# Patient Record
Sex: MALE | Race: WHITE | Employment: UNEMPLOYED | URBAN - METROPOLITAN AREA
[De-identification: names, ages, dates, MRNs, and addresses within clinical notes are randomized per-mention and may not be internally consistent; named-entity substitution may affect disease eponyms.]

---

## 2017-04-12 ENCOUNTER — OFFICE VISIT (OUTPATIENT)
Dept: FAMILY MEDICINE CLINIC | Age: 62
End: 2017-04-12

## 2017-04-12 VITALS
OXYGEN SATURATION: 97 % | WEIGHT: 177.4 LBS | DIASTOLIC BLOOD PRESSURE: 91 MMHG | SYSTOLIC BLOOD PRESSURE: 169 MMHG | RESPIRATION RATE: 18 BRPM | TEMPERATURE: 97.7 F | BODY MASS INDEX: 26.89 KG/M2 | HEART RATE: 78 BPM | HEIGHT: 68 IN

## 2017-04-12 DIAGNOSIS — Z12.5 SCREENING PSA (PROSTATE SPECIFIC ANTIGEN): ICD-10-CM

## 2017-04-12 DIAGNOSIS — I69.328 CVA, OLD, SPEECH/LANGUAGE DEFICIT: Primary | ICD-10-CM

## 2017-04-12 DIAGNOSIS — Z72.0 TOBACCO ABUSE DISORDER: ICD-10-CM

## 2017-04-12 DIAGNOSIS — E78.00 HYPERCHOLESTEROLEMIA: ICD-10-CM

## 2017-04-12 DIAGNOSIS — I10 HTN, GOAL BELOW 130/80: ICD-10-CM

## 2017-04-12 DIAGNOSIS — R79.9 ABNORMAL FINDING OF BLOOD CHEMISTRY: ICD-10-CM

## 2017-04-12 LAB — HBA1C MFR BLD HPLC: 5.1 %

## 2017-04-12 RX ORDER — LOSARTAN POTASSIUM AND HYDROCHLOROTHIAZIDE 12.5; 5 MG/1; MG/1
1 TABLET ORAL DAILY
Qty: 30 TAB | Refills: 5 | Status: SHIPPED | OUTPATIENT
Start: 2017-04-12 | End: 2017-04-12 | Stop reason: SDUPTHER

## 2017-04-12 RX ORDER — ROSUVASTATIN CALCIUM 10 MG/1
TABLET, COATED ORAL
Qty: 90 TAB | Refills: 11 | Status: SHIPPED | OUTPATIENT
Start: 2017-04-12 | End: 2017-12-28 | Stop reason: SDUPTHER

## 2017-04-12 RX ORDER — LOSARTAN POTASSIUM AND HYDROCHLOROTHIAZIDE 12.5; 5 MG/1; MG/1
TABLET ORAL
Qty: 90 TAB | Refills: 5 | Status: SHIPPED | OUTPATIENT
Start: 2017-04-12 | End: 2017-05-01 | Stop reason: ALTCHOICE

## 2017-04-12 RX ORDER — ASPIRIN 81 MG/1
81 TABLET ORAL DAILY
COMMUNITY
End: 2018-11-28 | Stop reason: SDUPTHER

## 2017-04-12 RX ORDER — ROSUVASTATIN CALCIUM 10 MG/1
10 TABLET, COATED ORAL
Qty: 30 TAB | Refills: 11 | Status: SHIPPED | OUTPATIENT
Start: 2017-04-12 | End: 2017-04-12 | Stop reason: SDUPTHER

## 2017-04-12 NOTE — PROGRESS NOTES
HISTORY OF PRESENT ILLNESS  Milena Rodriguez is a 63 y.o. male. HPI   Present as new pt,s tating that his [address] would not take his [employer] for almost one yr did not see any provider as he states currently taking 81mg of aspirin as his only meds, no colonscopy has been done , nor any colon screening done on him so far    HTN w/ hs of CVA, and rt side weaknesses  Today pt present for Bp check and the patient stating that so far there has not been a Compliancy w/ the bp medsfor >1yr, w/ hx of stroke in 2010, at that time he was taking bp meds,and opioid based pain meds and the daily aspirin, ++cigs 1ppd for 30yrs, was taking 2ppd but recnetly to 1ppd, now with  rt sided hemiparesis, uses the cane for mobility, lives by self a home, no other provider, able to do his ADL at this time,   He is having had the low salt diet,  the patient has been active life style and does do the daily walking,  pt states that patien does not obtain the bp at home   today the pt denies Chest Pain, has no legs swelling no lightheadedness,   the pat has not been feeling anxious, and  Has not been feeling stressed out, having nl interest to do things,  otherwise feeling better since the last visit    Current Outpatient Prescriptions   Medication Sig Dispense Refill    aspirin delayed-release 81 mg tablet Take 81 mg by mouth daily. No Known Allergies  Past Medical History:   Diagnosis Date    Acute MI, inferoposterior wall, initial episode of care ([facility] [address] 4/22/2011    Arthritis     CAD (coronary artery disease) 4/23/2011    CAD (coronary artery disease), native coronary artery 4/22/2011    Chronic pain     Hypertension 4/22/2011    Stroke ([facility] [address]     Unstable angina ([address] 4/22/2011     History reviewed. No pertinent surgical history.   Family History   Problem Relation Age of Onset   Orien Haus Dementia Mother     Emphysema Father     Hypertension Father      Social History   Substance Use Topics    Smoking status: Current Every Day Smoker     Packs/day: 1.00     Years: 35.00    Smokeless tobacco: Never Used    Alcohol use No      No results found for: HBA1C, PUQ3KHNE, HGBE8, GLU, GESTF, GLUCPOC, MCACR, MCA1, MCA2, MCA3, MCA4, UMCA, MCAU, LDL, DLDL, LDLC, DLDLP, FORTUNATO, CREAPOC, MCREA, REFC7, ACREA, CREA, REFC3, REFC4, KLQ2EDKF   No results found for: CHOL, CHOLX, CHLST, CHOLV, HDL, LDL, DLDL, LDLC, DLDLP, TGL, TGLX, TRIGL, [id number], TRIGP, CHHD, CHHDX       Review of Systems   Constitutional: Negative for chills and fever. HENT: Negative for ear pain and nosebleeds. Eyes: Negative for blurred vision, pain and discharge. Respiratory: Negative for shortness of breath. Cardiovascular: Negative for chest pain and leg swelling. Gastrointestinal: Negative for constipation, diarrhea, nausea and vomiting. Genitourinary: Negative for frequency. Musculoskeletal: Negative for joint pain. Skin: Negative for itching and rash. Neurological: Positive for tingling and focal weakness. Negative for headaches. Psychiatric/Behavioral: Negative for depression. The patient is not nervous/anxious. Physical Exam   Constitutional: He is oriented to person, place, and time. He appears well-developed and well-nourished. HENT:   Head: Normocephalic and atraumatic. Mouth/Throat: No oropharyngeal exudate. Eyes: Conjunctivae and EOM are normal.   Neck: Normal range of motion. Neck supple. Cardiovascular: Normal rate, regular rhythm and normal heart sounds. No murmur heard. Pulmonary/Chest: Effort normal and breath sounds normal. No respiratory distress. Abdominal: Soft. Bowel sounds are normal. He exhibits no distension. There is no rebound. Musculoskeletal: He exhibits deformity. He exhibits no edema or tenderness. Neurological: He is alert and oriented to person, place, and time. He displays abnormal reflex. A cranial nerve deficit is present. He exhibits abnormal muscle tone. Coordination abnormal.   Skin: Skin is warm. No erythema. Psychiatric: He has a normal mood and affect. His behavior is normal.   Nursing note and vitals reviewed. ASSESSMENT and PLAN  Manpreet Amin was seen today for new patient. Diagnoses and all orders for this visit:    CVA, old, speech/language deficit  -     CBC W/O DIFF  -     METABOLIC PANEL, COMPREHENSIVE  -     TSH 3RD GENERATION  -     LIPID PANEL  -     Cancel: HEMOGLOBIN A1C WITH EAG  -     AMB POC HEMOGLOBIN A1C    HTN, goal below 130/80  -     CBC W/O DIFF  -     METABOLIC PANEL, COMPREHENSIVE  -     TSH 3RD GENERATION  -     LIPID PANEL  -     Cancel: HEMOGLOBIN A1C WITH EAG  -     AMB POC HEMOGLOBIN A1C    Hypercholesterolemia  -     CBC W/O DIFF  -     METABOLIC PANEL, COMPREHENSIVE  -     TSH 3RD GENERATION  -     LIPID PANEL  -     Cancel: HEMOGLOBIN A1C WITH EAG  -     AMB POC HEMOGLOBIN A1C    Tobacco abuse disorder  -     CBC W/O DIFF  -     METABOLIC PANEL, COMPREHENSIVE  -     TSH 3RD GENERATION  -     LIPID PANEL  -     Cancel: HEMOGLOBIN A1C WITH EAG    Screening PSA (prostate specific antigen)  -     CBC W/O DIFF  -     METABOLIC PANEL, COMPREHENSIVE  -     TSH 3RD GENERATION  -     LIPID PANEL  -     Cancel: HEMOGLOBIN A1C WITH EAG  -     PSA - PROSTATE SPECIFIC AG    Abnormal finding of blood chemistry   -     Cancel: HEMOGLOBIN A1C WITH EAG  -     AMB POC HEMOGLOBIN A1C    Other orders  -     losartan-hydroCHLOROthiazide (HYZAAR) 50-12.5 mg per tablet; Take 1 Tab by mouth daily. -     rosuvastatin (CRESTOR) 10 mg tablet; Take 1 Tab by mouth nightly.         bp not controlled on no ACEI, no Statin, Asa may not be a good choice for him since failed to protect him in the past, abstinence for cig advised, will let us know on the next visit   start an active  life style mod,for which includes to do list such as the light start of physical activities with a daily brisk walking 30 minutes most days of the week,  Trying to avoid fatty fast foods, have and continue with low-fat low-cholesterol diet,adding fatty fish such as [place], [place], [place] to the diet 3-4 times per week and finally have a low-salt and K rich food intake, all mention has to be done at least most days of the weeks for a better outcome than needed labs will be repeated in 3-6 months.   Handout also given to the patient for a better understanding,   Patient agreed with today's recommendation

## 2017-04-12 NOTE — MR AVS SNAPSHOT
Visit Information Date & Time Provider Department Dept. Phone Encounter #  
 4/12/2017 10:00 AM Santosh Love MD [address] [facility] [phone] [id number] Follow-up Instructions Return in about 2 weeks (around 4/26/2017). Follow-up and Disposition History Upcoming Health Maintenance Date Due Hepatitis C Screening 1955 DTaP/Tdap/Td series (1 - Tdap) 12/31/1978 FOBT Q 1 YEAR AGE 50-75 12/31/2007 INFLUENZA AGE 9 TO ADULT 8/1/2016 Allergies as of 4/12/2017  Review Complete On: 4/12/2017 By: Antwon Cheung LPN No Known Allergies Current Immunizations  Reviewed on 4/12/2017 Name Date Influenza Vaccine Split  Deferred (Patient Refused) Pneumococcal Vaccine (Unspecified Type)  Deferred (Patient Refused) Reviewed by Santosh Love MD on 4/12/2017 at 10:37 AM  
 Reviewed by Santosh Love MD on 4/12/2017 at 10:37 AM  
 Reviewed by Santosh Love MD on 4/12/2017 at 10:37 AM  
 Reviewed by Santosh Love MD on 4/12/2017 at 10:38 AM  
You Were Diagnosed With   
  
 Codes Comments CVA, old, speech/language deficit    -  Primary ICD-10-CM: U70.426 ICD-9-CM: 438.10 HTN, goal below 130/80     ICD-10-CM: I10 
ICD-9-CM: 401.9 Hypercholesterolemia     ICD-10-CM: E78.00 ICD-9-CM: 272.0 Tobacco abuse disorder     ICD-10-CM: Z72.0 ICD-9-CM: 305.1 Screening PSA (prostate specific antigen)     ICD-10-CM: Z12.5 ICD-9-CM: V76.44 Abnormal finding of blood chemistry     ICD-10-CM: R79.9 ICD-9-CM: 790.6 Vitals BP Pulse Temp Resp Height(growth percentile) Weight(growth percentile) (!) 186/102 (BP 1 Location: Right arm, BP Patient Position: Sitting) 78 97.7 °F (36.5 °C) (Oral) 18 5' 8\" (1.727 m) 177 lb 6.4 oz (80.5 kg) SpO2 BMI Smoking Status 97% 26.97 kg/m2 Current Every Day Smoker Vitals History BMI and BSA Data  Body Mass Index Body Surface Area  
 26.97 kg/m 2 1.97 m 2  
 Preferred Pharmacy Pharmacy Name Phone [facility] [address] [facility] [phone] Your Updated Medication List  
  
   
This list is accurate as of: 4/12/17 10:47 AM.  Always use your most recent med list.  
  
  
  
  
 aspirin delayed-release 81 mg tablet Take 81 mg by mouth daily. losartan-hydroCHLOROthiazide 50-12.5 mg per tablet Commonly known as:  HYZAAR Take 1 Tab by mouth daily. rosuvastatin 10 mg tablet Commonly known as:  CRESTOR Take 1 Tab by mouth nightly. Prescriptions Sent to Pharmacy Refills  
 losartan-hydroCHLOROthiazide (HYZAAR) 50-12.5 mg per tablet 5 Sig: Take 1 Tab by mouth daily. Class: Normal  
 Pharm[facility], [address] [place] [address] RD AT [address] [place] [address] Ph #: [phone] Route: Oral  
 rosuvastatin (CRESTOR) 10 mg tablet 11 Sig: Take 1 Tab by mouth nightly. Class: Normal  
 Pharmacy: [facility] [address], [address] [place] [address] RD AT [address] [place] [address] Ph #: [phone] Route: Oral  
  
We Performed the Following AMB POC HEMOGLOBIN A1C [11645 CPT(R)] CBC W/O DIFF [13996 CPT(R)] LIPID PANEL [68397 CPT(R)] METABOLIC PANEL, COMPREHENSIVE [82914 CPT(R)] PROSTATE SPECIFIC AG (PSA) [id number] CPT(R)] TSH 3RD GENERATION [22420 CPT(R)] Follow-up Instructions Return in about 2 weeks (around 4/26/2017). Introducing [facility]! Eugene Negrete introduces [employer] patient portal. Now you can access parts of your medical record, email your doctor's office, and request medication refills online. 1. In your internet browser, go to [email or web address] 2. Click on the First Time User? Click Here link in the Sign In box. You will see the New Member Sign Up page. 3. Enter your [employer] Access Code exactly as it appears below. You will not need to use this code after youve completed the sign-up process. If you do not sign up before the expiration date, you must request a new code. · [employer] Access Code: [email or web address] Expires: 7/11/2017 10:47 AM 
 
4. Enter the last four digits of your Social Security Number (xxxx) and Date of Birth (mm/dd/yyyy) as indicated and click Submit. You will be taken to the next sign-up page. 5. Create a [employer] ID. This will be your [employer] login ID and cannot be changed, so think of one that is secure and easy to remember. 6. Create a [employer] password. You can change your password at any time. 7. Enter your Password Reset Question and Answer. This can be used at a later time if you forget your password. 8. Enter your e-mail address. You will receive e-mail notification when new information is available in [address] 9. Click Sign Up. You can now view and download portions of your medical record. 10. Click the Download Summary menu link to download a portable copy of your medical information. If you have questions, please visit the Frequently Asked Questions section of the [employer] website. Remember, [employer] is NOT to be used for urgent needs. For medical emergencies, dial 911. Now available from your iPhone and Android! Please provide this summary of care documentation to your next provider. Your primary care clinician is listed as ALLIE Aguilar. If you have any questions after today's visit, please call [phone].

## 2017-04-12 NOTE — PROGRESS NOTES
Chief Complaint   Patient presents with    New Patient     Get established. Hx of HTN not taking meds, has been at least a year. Wants pain med for arthritis.

## 2017-04-13 LAB
ALBUMIN SERPL-MCNC: 4 G/DL (ref 3.5–5.5)
ALBUMIN/GLOB SERPL: 1.6 {RATIO} (ref 1.2–2.2)
ALP SERPL-CCNC: 66 IU/L (ref 39–117)
ALT SERPL-CCNC: 6 IU/L (ref 0–44)
AST SERPL-CCNC: 12 IU/L (ref 0–40)
BILIRUB SERPL-MCNC: 0.3 MG/DL (ref 0–1.2)
BUN SERPL-MCNC: 10 MG/DL (ref 6–24)
BUN/CREAT SERPL: 13 (ref 9–20)
CALCIUM SERPL-MCNC: 8.6 MG/DL (ref 8.7–10.2)
CHLORIDE SERPL-SCNC: 102 MMOL/L (ref 96–106)
CHOLEST SERPL-MCNC: 131 MG/DL (ref 100–199)
CO2 SERPL-SCNC: 25 MMOL/L (ref 18–29)
CREAT SERPL-MCNC: 0.8 MG/DL (ref 0.76–1.27)
ERYTHROCYTE [DISTWIDTH] IN BLOOD BY AUTOMATED COUNT: 15.2 % (ref 12.3–15.4)
GLOBULIN SER CALC-MCNC: 2.5 G/DL (ref 1.5–4.5)
GLUCOSE SERPL-MCNC: 94 MG/DL (ref 65–99)
HCT VFR BLD AUTO: 35.7 % (ref 37.5–51)
HDLC SERPL-MCNC: 35 MG/DL
HGB BLD-MCNC: 10.9 G/DL (ref 12.6–17.7)
LDLC SERPL CALC-MCNC: 76 MG/DL (ref 0–99)
MCH RBC QN AUTO: 25.1 PG (ref 26.6–33)
MCHC RBC AUTO-ENTMCNC: 30.5 G/DL (ref 31.5–35.7)
MCV RBC AUTO: 82 FL (ref 79–97)
PLATELET # BLD AUTO: 239 X10E3/UL (ref 150–379)
POTASSIUM SERPL-SCNC: 4.5 MMOL/L (ref 3.5–5.2)
PROT SERPL-MCNC: 6.5 G/DL (ref 6–8.5)
PSA SERPL-MCNC: 0.7 NG/ML (ref 0–4)
RBC # BLD AUTO: 4.35 X10E6/UL (ref 4.14–5.8)
SODIUM SERPL-SCNC: 140 MMOL/L (ref 134–144)
TRIGL SERPL-MCNC: 100 MG/DL (ref 0–149)
TSH SERPL DL<=0.005 MIU/L-ACNC: 1.03 UIU/ML (ref 0.45–4.5)
VLDLC SERPL CALC-MCNC: 20 MG/DL (ref 5–40)
WBC # BLD AUTO: 6.6 X10E3/UL (ref 3.4–10.8)

## 2017-05-01 ENCOUNTER — OFFICE VISIT (OUTPATIENT)
Dept: FAMILY MEDICINE CLINIC | Age: 62
End: 2017-05-01

## 2017-05-01 VITALS
HEART RATE: 67 BPM | RESPIRATION RATE: 14 BRPM | BODY MASS INDEX: 26.52 KG/M2 | TEMPERATURE: 98.9 F | DIASTOLIC BLOOD PRESSURE: 81 MMHG | WEIGHT: 175 LBS | OXYGEN SATURATION: 98 % | SYSTOLIC BLOOD PRESSURE: 158 MMHG | HEIGHT: 68 IN

## 2017-05-01 DIAGNOSIS — Z12.11 SCREEN FOR COLON CANCER: ICD-10-CM

## 2017-05-01 DIAGNOSIS — Z11.59 NEED FOR HEPATITIS C SCREENING TEST: ICD-10-CM

## 2017-05-01 DIAGNOSIS — Z87.891 PERSONAL HISTORY OF NICOTINE DEPENDENCE: ICD-10-CM

## 2017-05-01 DIAGNOSIS — Z72.0 TOBACCO ABUSE DISORDER: ICD-10-CM

## 2017-05-01 DIAGNOSIS — I69.328 CVA, OLD, SPEECH/LANGUAGE DEFICIT: ICD-10-CM

## 2017-05-01 DIAGNOSIS — I10 HTN, GOAL BELOW 130/80: Primary | ICD-10-CM

## 2017-05-01 RX ORDER — LOSARTAN POTASSIUM AND HYDROCHLOROTHIAZIDE 25; 100 MG/1; MG/1
1 TABLET ORAL DAILY
Qty: 90 TAB | Refills: 2 | Status: SHIPPED | OUTPATIENT
Start: 2017-05-01 | End: 2017-05-01 | Stop reason: SDUPTHER

## 2017-05-01 RX ORDER — LOSARTAN POTASSIUM AND HYDROCHLOROTHIAZIDE 25; 100 MG/1; MG/1
1 TABLET ORAL DAILY
Qty: 90 TAB | Refills: 2 | Status: SHIPPED | OUTPATIENT
Start: 2017-05-01 | End: 2017-12-28 | Stop reason: SDUPTHER

## 2017-05-01 NOTE — PROGRESS NOTES
HISTORY OF PRESENT ILLNESS  Raymond Dodge is a 63 y.o. male. HPI present w/ hx of CVA in 2009, with left sided hemiparesis, still smokes his cigs at this time, lab resutl significant for los H/H >10/30 and low HDL of 35 normal A1c,   Present for bp check and results discussion today states that the patient stating that so far there has been a Compliancy w/ the bp meds,  He is having had the low salt diet,   He is trying to avoid smoked meats,   the patient hasnot been active life style but does a lot of daily walking at home, in addition pt states that patien does obtain the bp at home, his last BP  169/91 (BP 1 Location: Left arm) , pt on his last visit was only taking baby aspirin 81mg daily, was on no ACEI nor any statin     today the pt denies Chest Pain, has no legs swelling no lightheadedness,  Last visit was started on statin and losartan hctz 50/12.5mg daily,   the pat has not been feeling anxious, and  Has not been feeling stressed out, otherwise feeling better since the last visit      Current Outpatient Prescriptions   Medication Sig Dispense Refill    aspirin delayed-release 81 mg tablet Take 81 mg by mouth daily.  rosuvastatin (CRESTOR) 10 mg tablet TAKE 1 TABLET BY MOUTH EVERY NIGHT 90 Tab 11    losartan-hydroCHLOROthiazide (HYZAAR) 50-12.5 mg per tablet TAKE 1 TABLET BY MOUTH DAILY 90 Tab 5     No Known Allergies  Past Medical History:   Diagnosis Date    Acute MI, inferoposterior wall, initial episode of care ([facility] [address] 4/22/2011    Arthritis     CAD (coronary artery disease) 4/23/2011    CAD (coronary artery disease), native coronary artery 4/22/2011    Chronic pain     CVA, old, speech/language deficit 4/12/2017    HTN, goal below 130/80 4/12/2017    Hypercholesterolemia 4/12/2017    Hypertension 4/22/2011    Stroke ([address]     Tobacco abuse disorder 4/12/2017    Unstable angina ([address] 4/22/2011     No past surgical history on file.   Family History   Problem Relation Age of Onset  Dementia Mother     Emphysema Father     Hypertension Father      Social History   Substance Use Topics    Smoking status: Current Every Day Smoker     Packs/day: 1.00     Years: 35.00    Smokeless tobacco: Never Used    Alcohol use No      Lab Results  Component Value Date/Time   WBC 6.6 04/12/2017 10:49 AM   HGB 10.9 04/12/2017 10:49 AM   HCT 35.7 04/12/2017 10:49 AM   PLATELET 719 97/63/0601 10:49 AM   MCV 82 04/12/2017 10:49 AM       Lab Results  Component Value Date/Time   Glucose 94 04/12/2017 10:49 AM   LDL, calculated 76 04/12/2017 10:49 AM   Creatinine 0.80 04/12/2017 10:49 AM      Lab Results  Component Value Date/Time   Cholesterol, total 131 04/12/2017 10:49 AM   HDL Cholesterol 35 04/12/2017 10:49 AM   LDL, calculated 76 04/12/2017 10:49 AM   Triglyceride 100 04/12/2017 10:49 AM       Lab Results  Component Value Date/Time   ALT (SGPT) 6 04/12/2017 10:49 AM   AST (SGOT) 12 04/12/2017 10:49 AM   Alk. phosphatase 66 04/12/2017 10:49 AM   Bilirubin, total 0.3 04/12/2017 10:49 AM       Lab Results  Component Value Date/Time   GFR est  04/12/2017 10:49 AM   GFR est non-AA 98 04/12/2017 10:49 AM   Creatinine 0.80 04/12/2017 10:49 AM   BUN 10 04/12/2017 10:49 AM   Sodium 140 04/12/2017 10:49 AM   Potassium 4.5 04/12/2017 10:49 AM   Chloride 102 04/12/2017 10:49 AM   CO2 25 04/12/2017 10:49 AM      Lab Results  Component Value Date/Time   Prostate Specific Ag 0.7 04/12/2017 10:49 AM     Lab Results  Component Value Date/Time   TSH 1.030 04/12/2017 10:49 AM         Review of Systems   Constitutional: Negative for chills and fever. HENT: Negative for ear pain and nosebleeds. Eyes: Negative for blurred vision, pain and discharge. Respiratory: Negative for shortness of breath. Cardiovascular: Negative for chest pain and leg swelling. Gastrointestinal: Negative for constipation, diarrhea, nausea and vomiting. Genitourinary: Negative for frequency.    Musculoskeletal: Negative for joint pain.   Skin: Negative for itching and rash. Neurological: Negative for headaches. Psychiatric/Behavioral: Negative for depression. The patient is not nervous/anxious. Physical Exam   Constitutional: He is oriented to person, place, and time. He appears well-developed and well-nourished. HENT:   Head: Normocephalic and atraumatic. Mouth/Throat: No oropharyngeal exudate. Eyes: Conjunctivae and EOM are normal.   Neck: Normal range of motion. Neck supple. Cardiovascular: Normal rate, regular rhythm and normal heart sounds. No murmur heard. Pulmonary/Chest: Effort normal and breath sounds normal. No respiratory distress. Abdominal: Soft. Bowel sounds are normal. He exhibits no distension. There is no rebound. Musculoskeletal: He exhibits no edema or tenderness. Neurological: He is alert and oriented to person, place, and time. Skin: Skin is warm. No erythema. Psychiatric: He has a normal mood and affect. His behavior is normal.   Nursing note and vitals reviewed. ASSESSMENT and PLAN  Tammy Avila was seen today for results. Diagnoses and all orders for this visit:    HTN, goal below 130/80    Need for hepatitis C screening test  -     HEPATITIS C AB    Screen for colon cancer  -     OCCULT BLOOD, IMMUNOASSAY (FIT)    CVA, old, speech/language deficit    Tobacco abuse disorder  -     CT LOW DOSE LUNG CANCER SCREENING; Future    Personal history of nicotine dependence   -     CT LOW DOSE LUNG CANCER SCREENING; Future    Other orders  -     losartan-hydroCHLOROthiazide (HYZAAR) 100-25 mg per tablet; Take 1 Tab by mouth daily.       HTN not controlled, will change of bp meds at this time,  Discussed sodium restriction, high k rich diet,  maintaining ideal body weight and regular exercise program such as daily walking 30 min perday 4-5 times per week,  medication compliance advised, was told to call back for rfs or of any concern, no cigs readvised pt will retry till next visit, reevaluate in 6months

## 2017-05-01 NOTE — PATIENT INSTRUCTIONS
Learning About Benefits From Quitting Smoking  How does quitting smoking make you healthier? If you're thinking about quitting smoking, you may have a few reasons to be smoke-free. Your health may be one of them. · When you quit smoking, you lower your risks for cancer, lung disease, heart attack, stroke, blood vessel disease, and blindness from macular degeneration. · When you're smoke-free, you get sick less often, and you heal faster. You are less likely to get colds, flu, bronchitis, and pneumonia. · As a nonsmoker, you may find that your mood is better and you are less stressed. When and how will you feel healthier? Quitting has real health benefits that start from day 1 of being smoke-free. And the longer you stay smoke-free, the healthier you get and the better you feel. The first hours  · After just 20 minutes, your blood pressure and heart rate go down. That means there's less stress on your heart and blood vessels. · Within 12 hours, the level of carbon monoxide in your blood drops back to normal. That makes room for more oxygen. With more oxygen in your body, you may notice that you have more energy than when you smoked. After 2 weeks  · Your lungs start to work better. · Your risk of heart attack starts to drop. After 1 month  · When your lungs are clear, you cough less and breathe deeper, so it's easier to be active. · Your sense of taste and smell return. That means you can enjoy food more than you have since you started smoking. Over the years  · After 1 year, your risk of heart disease is half what it would be if you kept smoking. · After 5 years, your risk of stroke starts to shrink. Within a few years after that, it's about the same as if you'd never smoked. · After 10 years, your risk of dying from lung cancer is cut by about half. And your risk for many other types of cancer is lower too. How would quitting help others in your life?   When you quit smoking, you improve the health of everyone who now breathes in your smoke. · Their heart, lung, and cancer risks drop, much like yours. · They are sick less. For babies and small children, living smoke-free means they're less likely to have ear infections, pneumonia, and bronchitis. · If you're a woman who is or will be pregnant someday, quitting smoking means a healthier . · Children who are close to you are less likely to become adult smokers. Where can you learn more? Go to http://nereyda-micki.info/. Enter [id number] [id number] [id number] in the search box to learn more about \"Learning About Benefits From Quitting Smoking. \"  Current as of: May 26, 2016  Content Version: 11.2  © 9642-1587 [employer]. Care instructions adapted under license by [employer] (which disclaims liability or warranty for this information). If you have questions about a medical condition or this instruction, always ask your healthcare professional. [place] [address] any warranty or liability for your use of this information. Learning About High Blood Pressure  What is high blood pressure? Blood pressure is a measure of how hard the blood pushes against the walls of your arteries. It's normal for blood pressure to go up and down throughout the day, but if it stays up, you have high blood pressure. Another name for high blood pressure is hypertension. Two numbers tell you your blood pressure. The first number is the systolic pressure. It shows how hard the blood pushes when your heart is pumping. The second number is the diastolic pressure. It shows how hard the blood pushes between heartbeats, when your heart is relaxed and filling with blood. A blood pressure of less than 120/80 (say \"120 over 80\") is ideal for an adult. High blood pressure is 140/90 or higher. You have high blood pressure if your top number is 140 or higher or your bottom number is 90 or higher, or both.  Many people fall into the category in between, called prehypertension. People with prehypertension need to make lifestyle changes to bring their blood pressure down and help prevent or delay high blood pressure. What happens when you have high blood pressure? · Blood flows through your arteries with too much force. Over time, this damages the walls of your arteries. But you can't feel it. High blood pressure usually doesn't cause symptoms. · Fat and calcium start to build up in your arteries. This buildup is called plaque. Plaque makes your arteries narrower and stiffer. Blood can't flow through them as easily. · This lack of good blood flow starts to damage some of the organs in your body. This can lead to problems such as coronary artery disease and heart attack, heart failure, stroke, kidney failure, and eye damage. How can you prevent high blood pressure? · Stay at a healthy weight. · Try to limit how much sodium you eat to less than 2,300 milligrams (mg) a day. If you limit your sodium to 1,500 mg a day, you can lower your blood pressure even more. ¨ Buy foods that are labeled \"unsalted,\" \"sodium-free,\" or \"low-sodium. \" Foods labeled \"reduced-sodium\" and \"light sodium\" may still have too much sodium. ¨ Flavor your food with garlic, lemon juice, onion, vinegar, herbs, and spices instead of salt. Do not use soy sauce, steak sauce, onion salt, garlic salt, mustard, or ketchup on your food. ¨ Use less salt (or none) when recipes call for it. You can often use half the salt a recipe calls for without losing flavor. · Be physically active. Get at least 30 minutes of exercise on most days of the week. Walking is a good choice. You also may want to do other activities, such as running, swimming, cycling, or playing tennis or team sports. · Limit alcohol to 2 drinks a day for men and 1 drink a day for women. · Eat plenty of fruits, vegetables, and low-fat dairy products. Eat less saturated and total fats.   How is high blood pressure treated? · Your doctor will suggest making lifestyle changes. For example, your doctor may ask you to eat healthy foods, quit smoking, lose extra weight, and be more active. · If lifestyle changes don't help enough or your blood pressure is very high, you will have to take medicine every day. Follow-up care is a key part of your treatment and safety. Be sure to make and go to all appointments, and call your doctor if you are having problems. It's also a good idea to know your test results and keep a list of the medicines you take. Where can you learn more? Go to http://nereyda-micki.info/. Enter P501 in the search box to learn more about \"Learning About High Blood Pressure. \"  Current as of: March 23, 2016  Content Version: 11.2  © 0322-3473 [employer]. Care instructions adapted under license by [employer] (which disclaims liability or warranty for this information). If you have questions about a medical condition or this instruction, always ask your healthcare professional. [place] [address] any warranty or liability for your use of this information. Deciding About Using Medicines To Quit Smoking  What are the medicines you can use? Your doctor may prescribe varenicline (Chantix) or bupropion (Zyban). These medicines can help you cope with cravings for tobacco. They are pills that don't contain nicotine. You also can use nicotine replacement products. These do contain nicotine. There are many types. · Gum and lozenges slowly release nicotine into your mouth. · Patches stick to your skin. They slowly release nicotine into your bloodstream.  · An inhaler has a gallagher that contains nicotine. You breathe in a puff of nicotine vapor through your mouth and throat. · Nasal spray releases a mist that contains nicotine. What are key points about this decision? · Using medicines can double your chances of quitting smoking.  They can ease cravings and withdrawal symptoms. · Getting counseling along with using medicine can raise your chances of quitting even more. · If you smoke fewer than 5 cigarettes a day, you may not need medicines to help you quit smoking. · These medicines have less nicotine than cigarettes. And by itself, nicotine is not nearly as harmful as smoking. The tars, carbon monoxide, and other toxic chemicals in tobacco cause the harmful effects. · The side effects of nicotine replacement products depend on the type of product. For example, a patch can make your skin red and itchy. Medicines in pill form can make you sick to your stomach. They can also cause dry mouth and trouble sleeping. For most people, the side effects are not bad enough to make them stop using the products. · FDA warning. The U[place]. Food and Drug Administration (FDA) warns that people who are taking bupropion or varenicline and who have any serious or unusual changes in mood or behavior or who feel like hurting themselves or someone else should stop taking the medicine and call a doctor right away. If you already have a mood or behavior problem, be sure to tell your doctor before you decide to use these medicines. Why might you choose to use medicines to quit smoking? · You have tried on your own to stop smoking, but you were not able to stop. · You smoke more than 5 cigarettes a day. · You want to increase your chances of quitting smoking. · You want to reduce your cravings and withdrawal symptoms. · You feel the benefits of medicine outweigh the side effects. Why might you choose not to use medicine? · You want to try quitting on your own by stopping all at once (\"cold turkey\"). · You want to cut back slowly on the number of cigarettes you smoke. · You smoke fewer than 5 cigarettes a day. · You do not like using medicine. · You feel the side effects of medicines outweigh the benefits. · You are worried about the cost of medicines.   Your decision  Thinking about the facts and your feelings can help you make a decision that is right for you. Be sure you understand the benefits and risks of your options, and think about what else you need to do before you make the decision. Where can you learn more? Go to http://nereyda-micki.info/. Enter A135 in the search box to learn more about \"Deciding About Using Medicines To Quit Smoking. \"  Current as of: May 26, 2016  Content Version: 11.2  © 1825-7064 [employer]. Care instructions adapted under license by [employer] (which disclaims liability or warranty for this information). If you have questions about a medical condition or this instruction, always ask your healthcare professional. [address] any warranty or liability for your use of this information. Learning About High Blood Pressure  What is high blood pressure? Blood pressure is a measure of how hard the blood pushes against the walls of your arteries. It's normal for blood pressure to go up and down throughout the day, but if it stays up, you have high blood pressure. Another name for high blood pressure is hypertension. Two numbers tell you your blood pressure. The first number is the systolic pressure. It shows how hard the blood pushes when your heart is pumping. The second number is the diastolic pressure. It shows how hard the blood pushes between heartbeats, when your heart is relaxed and filling with blood. A blood pressure of less than 120/80 (say \"120 over 80\") is ideal for an adult. High blood pressure is 140/90 or higher. You have high blood pressure if your top number is 140 or higher or your bottom number is 90 or higher, or both. Many people fall into the category in between, called prehypertension. People with prehypertension need to make lifestyle changes to bring their blood pressure down and help prevent or delay high blood pressure.   What happens when you have high blood pressure? · Blood flows through your arteries with too much force. Over time, this damages the walls of your arteries. But you can't feel it. High blood pressure usually doesn't cause symptoms. · Fat and calcium start to build up in your arteries. This buildup is called plaque. Plaque makes your arteries narrower and stiffer. Blood can't flow through them as easily. · This lack of good blood flow starts to damage some of the organs in your body. This can lead to problems such as coronary artery disease and heart attack, heart failure, stroke, kidney failure, and eye damage. How can you prevent high blood pressure? · Stay at a healthy weight. · Try to limit how much sodium you eat to less than 2,300 milligrams (mg) a day. If you limit your sodium to 1,500 mg a day, you can lower your blood pressure even more. ¨ Buy foods that are labeled \"unsalted,\" \"sodium-free,\" or \"low-sodium. \" Foods labeled \"reduced-sodium\" and \"light sodium\" may still have too much sodium. ¨ Flavor your food with garlic, lemon juice, onion, vinegar, herbs, and spices instead of salt. Do not use soy sauce, steak sauce, onion salt, garlic salt, mustard, or ketchup on your food. ¨ Use less salt (or none) when recipes call for it. You can often use half the salt a recipe calls for without losing flavor. · Be physically active. Get at least 30 minutes of exercise on most days of the week. Walking is a good choice. You also may want to do other activities, such as running, swimming, cycling, or playing tennis or team sports. · Limit alcohol to 2 drinks a day for men and 1 drink a day for women. · Eat plenty of fruits, vegetables, and low-fat dairy products. Eat less saturated and total fats. How is high blood pressure treated? · Your doctor will suggest making lifestyle changes. For example, your doctor may ask you to eat healthy foods, quit smoking, lose extra weight, and be more active.   · If lifestyle changes don't help enough or your blood pressure is very high, you will have to take medicine every day. Follow-up care is a key part of your treatment and safety. Be sure to make and go to all appointments, and call your doctor if you are having problems. It's also a good idea to know your test results and keep a list of the medicines you take. Where can you learn more? Go to http://nereyda-micki.info/. Enter P501 in the search box to learn more about \"Learning About High Blood Pressure. \"  Current as of: March 23, 2016  Content Version: 11.2  © 8468-1804 [employer]. Care instructions adapted under license by [employer] (which disclaims liability or warranty for this information). If you have questions about a medical condition or this instruction, always ask your healthcare professional. [address] any warranty or liability for your use of this information. Colon Cancer Screening: Care Instructions  Your Care Instructions  Colorectal cancer occurs in the colon or rectum. That's the lower part of your digestive system. It is the second-leading cause of cancer deaths in the [place]. It often starts with small growths called polyps in the colon or rectum. Polyps are usually found with screening tests. Depending on the type of test, any polyps found may be removed during the tests. Colorectal cancer usually does not cause symptoms at first. But regular tests can help find it early, before it spreads and becomes harder to treat. Experts advise routine tests for colon cancer for people starting at age 48. And they advise people with a higher risk of colon cancer to get tested sooner. Talk with your doctor about when you should start testing. Discuss which tests you need. Follow-up care is a key part of your treatment and safety. Be sure to make and go to all appointments, and call your doctor if you are having problems.  It's also a good idea to know your test results and keep a list of the medicines you take. What are the main screening tests for colon cancer? · Stool tests. These include the fecal immunochemical test (FIT) and the fecal occult blood test (FOBT). These tests check stool samples for signs of cancer. If your test is positive, you will need to have a colonoscopy. · Sigmoidoscopy. This test lets your doctor look at the lining of your rectum and the lowest part of your colon. Your doctor uses a lighted tube called a sigmoidoscope. This test can't find cancers or polyps in the upper part of your colon. In some cases, polyps that are found can be removed. But if your doctor finds polyps, you will need to have a colonoscopy to check the upper part of your colon. · Colonoscopy. This test lets your doctor look at the lining of your rectum and your entire colon. The doctor uses a thin, flexible tool called a colonoscope. It can also be used to remove polyps or get a tissue sample (biopsy). What tests do you need? The following guidelines are for people age 48 and over who are not at high risk for colorectal cancer. You may have at least one of these tests as directed by your doctor. · Fecal immunochemical test (FIT) or fecal occult blood test (FOBT) every year  · Sigmoidoscopy every 5 years  · Colonoscopy every 10 years  If you are age 76 and have had regular screenings or are age [de-identified] or older, you may not need screening. Talk with your doctor about when you need to be tested. And discuss which tests are right for you. Your doctor may recommend earlier or more frequent testing if you:  · Have had colorectal cancer before. · Have had colon polyps. · Have symptoms of colorectal cancer. These include blood in your stool and changes in your bowel habits. · Have a parent, brother or sister, or child with colon polyps or colorectal cancer. · Have a bowel disease. This includes ulcerative colitis and Crohn's disease.   · Have a rare polyp syndrome that runs in families, such as familial adenomatous polyposis (FAP). · Have had radiation treatments to the belly or pelvis. When should you call for help? Call your doctor now or seek immediate medical care if:  · Your stools are black and tarlike or have streaks of blood. · You have pain or tenderness in your lower belly. Watch closely for changes in your health, and be sure to contact your doctor if:  · You have diarrhea, constipation, or another change in bowel habits that does not get better. · Your stools are narrower than usual.  · You lose weight and you don't know why. · You have any questions about your screening tests or schedule. Where can you learn more? Go to http://nereyda-micki.info/. Enter M541 in the search box to learn more about \"Colon Cancer Screening: Care Instructions. \"  Current as of: July 26, 2016  Content Version: 11.2  © 8545-5973 [employer]. Care instructions adapted under license by [employer] (which disclaims liability or warranty for this information). If you have questions about a medical condition or this instruction, always ask your healthcare professional. [place] [address] any warranty or liability for your use of this information. DASH Diet: Care Instructions  Your Care Instructions  The DASH diet is an eating plan that can help lower your blood pressure. DASH stands for Dietary Approaches to Stop Hypertension. Hypertension is high blood pressure. The DASH diet focuses on eating foods that are high in calcium, potassium, and magnesium. These nutrients can lower blood pressure. The foods that are highest in these nutrients are fruits, vegetables, low-fat dairy products, nuts, seeds, and legumes. But taking calcium, potassium, and magnesium supplements instead of eating foods that are high in those nutrients does not have the same effect. The DASH diet also includes whole grains, fish, and poultry.   The DASH diet is one of several lifestyle changes your doctor may recommend to lower your high blood pressure. Your doctor may also want you to decrease the amount of sodium in your diet. Lowering sodium while following the DASH diet can lower blood pressure even further than just the DASH diet alone. Follow-up care is a key part of your treatment and safety. Be sure to make and go to all appointments, and call your doctor if you are having problems. It's also a good idea to know your test results and keep a list of the medicines you take. How can you care for yourself at home? Following the DASH diet  · Eat 4 to 5 servings of fruit each day. A serving is 1 medium-sized piece of fruit, ½ cup chopped or canned fruit, 1/4 cup dried fruit, or 4 ounces (½ cup) of fruit juice. Choose fruit more often than fruit juice. · Eat 4 to 5 servings of vegetables each day. A serving is 1 cup of lettuce or raw leafy vegetables, ½ cup of chopped or cooked vegetables, or 4 ounces (½ cup) of vegetable juice. Choose vegetables more often than vegetable juice. · Get 2 to 3 servings of low-fat and fat-free dairy each day. A serving is 8 ounces of milk, 1 cup of yogurt, or 1 ½ ounces of cheese. · Eat 6 to 8 servings of grains each day. A serving is 1 slice of bread, 1 ounce of dry cereal, or ½ cup of cooked rice, pasta, or cooked cereal. Try to choose whole-grain products as much as possible. · Limit lean meat, poultry, and fish to 2 servings each day. A serving is 3 ounces, about the size of a deck of cards. · Eat 4 to 5 servings of nuts, seeds, and legumes (cooked dried beans, lentils, and split peas) each week. A serving is 1/3 cup of nuts, 2 tablespoons of seeds, or ½ cup of cooked beans or peas. · Limit fats and oils to 2 to 3 servings each day. A serving is 1 teaspoon of vegetable oil or 2 tablespoons of salad dressing. · Limit sweets and added sugars to 5 servings or less a week.  A serving is 1 tablespoon jelly or jam, ½ cup sorbet, or 1 cup of lemonade. · Eat less than 2,300 milligrams (mg) of sodium a day. If you limit your sodium to 1,500 mg a day, you can lower your blood pressure even more. Tips for success  · Start small. Do not try to make dramatic changes to your diet all at once. You might feel that you are missing out on your favorite foods and then be more likely to not follow the plan. Make small changes, and stick with them. Once those changes become habit, add a few more changes. · Try some of the following:  ¨ Make it a goal to eat a fruit or vegetable at every meal and at snacks. This will make it easy to get the recommended amount of fruits and vegetables each day. ¨ Try yogurt topped with fruit and nuts for a snack or healthy dessert. ¨ Add lettuce, tomato, cucumber, and onion to sandwiches. ¨ Combine a ready-made pizza crust with low-fat mozzarella cheese and lots of vegetable toppings. Try using tomatoes, squash, spinach, broccoli, carrots, cauliflower, and onions. ¨ Have a variety of cut-up vegetables with a low-fat dip as an appetizer instead of chips and dip. ¨ Sprinkle sunflower seeds or chopped almonds over salads. Or try adding chopped walnuts or almonds to cooked vegetables. ¨ Try some vegetarian meals using beans and peas. Add garbanzo or kidney beans to salads. Make burritos and tacos with mashed asher beans or black beans. Where can you learn more? Go to http://nereyda-micki.info/. Enter E886 in the search box to learn more about \"DASH Diet: Care Instructions. \"  Current as of: March 23, 2016  Content Version: 11.2  © 6424-1830 [employer]. Care instructions adapted under license by [employer] (which disclaims liability or warranty for this information).  If you have questions about a medical condition or this instruction, always ask your healthcare professional. [address] any warranty or liability for your use of this information. High Blood Pressure: Care Instructions  Your Care Instructions  If your blood pressure is usually above 140/90, you have high blood pressure, or hypertension. That means the top number is 140 or higher or the bottom number is 90 or higher, or both. Despite what a lot of people think, high blood pressure usually doesn't cause headaches or make you feel dizzy or lightheaded. It usually has no symptoms. But it does increase your risk for heart attack, stroke, and kidney or eye damage. The higher your blood pressure, the more your risk increases. Your doctor will give you a goal for your blood pressure. Your goal will be based on your health and your age. An example of a goal is to keep your blood pressure below 140/90. Lifestyle changes, such as eating healthy and being active, are always important to help lower blood pressure. You might also take medicine to reach your blood pressure goal.  Follow-up care is a key part of your treatment and safety. Be sure to make and go to all appointments, and call your doctor if you are having problems. It's also a good idea to know your test results and keep a list of the medicines you take. How can you care for yourself at home? Medical treatment  · If you stop taking your medicine, your blood pressure will go back up. You may take one or more types of medicine to lower your blood pressure. Be safe with medicines. Take your medicine exactly as prescribed. Call your doctor if you think you are having a problem with your medicine. · Talk to your doctor before you start taking aspirin every day. Aspirin can help certain people lower their risk of a heart attack or stroke. But taking aspirin isn't right for everyone, because it can cause serious bleeding. · See your doctor regularly. You may need to see the doctor more often at first or until your blood pressure comes down.   · If you are taking blood pressure medicine, talk to your doctor before you take decongestants or anti-inflammatory medicine, such as ibuprofen. Some of these medicines can raise blood pressure. · Learn how to check your blood pressure at home. Lifestyle changes  · Stay at a healthy weight. This is especially important if you put on weight around the waist. Losing even 10 pounds can help you lower your blood pressure. · If your doctor recommends it, get more exercise. Walking is a good choice. Bit by bit, increase the amount you walk every day. Try for at least 30 minutes on most days of the week. You also may want to swim, bike, or do other activities. · Avoid or limit alcohol. Talk to your doctor about whether you can drink any alcohol. · Try to limit how much sodium you eat to less than 2,300 milligrams (mg) a day. Your doctor may ask you to try to eat less than 1,500 mg a day. · Eat plenty of fruits (such as bananas and oranges), vegetables, legumes, whole grains, and low-fat dairy products. · Lower the amount of saturated fat in your diet. Saturated fat is found in animal products such as milk, cheese, and meat. Limiting these foods may help you lose weight and also lower your risk for heart disease. · Do not smoke. Smoking increases your risk for heart attack and stroke. If you need help quitting, talk to your doctor about stop-smoking programs and medicines. These can increase your chances of quitting for good. When should you call for help? Call 911 anytime you think you may need emergency care. This may mean having symptoms that suggest that your blood pressure is causing a serious heart or blood vessel problem. Your blood pressure may be over 180/110. For example, call 911 if:  · You have symptoms of a heart attack. These may include:  ¨ Chest pain or pressure, or a strange feeling in the chest.  ¨ Sweating. ¨ Shortness of breath. ¨ Nausea or vomiting.   ¨ Pain, pressure, or a strange feeling in the back, neck, jaw, or upper belly or in one or both shoulders or arms.  ¨ Lightheadedness or sudden weakness. ¨ A fast or irregular heartbeat. · You have symptoms of a stroke. These may include:  ¨ Sudden numbness, tingling, weakness, or loss of movement in your face, arm, or leg, especially on only one side of your body. ¨ Sudden vision changes. ¨ Sudden trouble speaking. ¨ Sudden confusion or trouble understanding simple statements. ¨ Sudden problems with walking or balance. ¨ A sudden, severe headache that is different from past headaches. · You have severe back or belly pain. Do not wait until your blood pressure comes down on its own. Get help right away. Call your doctor now or seek immediate care if:  · Your blood pressure is much higher than normal (such as 180/110 or higher), but you don't have symptoms. · You think high blood pressure is causing symptoms, such as:  ¨ Severe headache. ¨ Blurry vision. Watch closely for changes in your health, and be sure to contact your doctor if:  · Your blood pressure measures 140/90 or higher at least 2 times. That means the top number is 140 or higher or the bottom number is 90 or higher, or both. · You think you may be having side effects from your blood pressure medicine. · Your blood pressure is usually normal, but it goes above normal at least 2 times. Where can you learn more? Go to http://nereyda-micki.info/. Enter F534 in the search box to learn more about \"High Blood Pressure: Care Instructions. \"  Current as of: August 8, 2016  Content Version: 11.2  © 7260-3636 [employer]. Care instructions adapted under license by [employer] (which disclaims liability or warranty for this information). If you have questions about a medical condition or this instruction, always ask your healthcare professional. [place] [address] any warranty or liability for your use of this information.        Low Sodium Diet (2,000 Milligram): Care Instructions  Your Care Instructions  Too much sodium causes your body to hold on to extra water. This can raise your blood pressure and force your heart and kidneys to work harder. In very serious cases, this could cause you to be put in the hospital. It might even be life-threatening. By limiting sodium, you will feel better and lower your risk of serious problems. The most common source of sodium is salt. People get most of the salt in their diet from canned, prepared, and packaged foods. Fast food and restaurant meals also are very high in sodium. Your doctor will probably limit your sodium to less than 2,000 milligrams (mg) a day. This limit counts all the sodium in prepared and packaged foods and any salt you add to your food. Follow-up care is a key part of your treatment and safety. Be sure to make and go to all appointments, and call your doctor if you are having problems. It's also a good idea to know your test results and keep a list of the medicines you take. How can you care for yourself at home? Read food labels  · Read labels on cans and food packages. The labels tell you how much sodium is in each serving. Make sure that you look at the serving size. If you eat more than the serving size, you have eaten more sodium. · Food labels also tell you the Percent Daily Value for sodium. Choose products with low Percent Daily Values for sodium. · Be aware that sodium can come in forms other than salt, including monosodium glutamate (MSG), sodium citrate, and sodium bicarbonate (baking soda). MSG is often added to Asian food. When you eat out, you can sometimes ask for food without MSG or added salt. Buy low-sodium foods  · Buy foods that are labeled \"unsalted\" (no salt added), \"sodium-free\" (less than 5 mg of sodium per serving), or \"low-sodium\" (less than 140 mg of sodium per serving). Foods labeled \"reduced-sodium\" and \"light sodium\" may still have too much sodium.  Be sure to read the label to see how much sodium you are getting. · Buy fresh vegetables, or frozen vegetables without added sauces. Buy low-sodium versions of canned vegetables, soups, and other canned goods. Prepare low-sodium meals  · Cut back on the amount of salt you use in cooking. This will help you adjust to the taste. Do not add salt after cooking. One teaspoon of salt has about 2,300 mg of sodium. · Take the salt shaker off the table. · Flavor your food with garlic, lemon juice, onion, vinegar, herbs, and spices. Do not use soy sauce, lite soy sauce, steak sauce, onion salt, garlic salt, celery salt, mustard, or ketchup on your food. · Use low-sodium salad dressings, sauces, and ketchup. Or make your own salad dressings and sauces without adding salt. · Use less salt (or none) when recipes call for it. You can often use half the salt a recipe calls for without losing flavor. Other foods such as rice, pasta, and grains do not need added salt. · Rinse canned vegetables, and cook them in fresh water. This removes somebut not allof the salt. · Avoid water that is naturally high in sodium or that has been treated with water softeners, which add sodium. Call your local water company to find out the sodium content of your water supply. If you buy bottled water, read the label and choose a sodium-free brand. Avoid high-sodium foods  · Avoid eating:  ¨ Smoked, cured, salted, and canned meat, fish, and poultry. ¨ Ham, davila, hot dogs, and luncheon meats. ¨ Regular, hard, and processed cheese and regular peanut butter. ¨ Crackers with salted tops, and other salted snack foods such as pretzels, chips, and salted popcorn. ¨ Frozen prepared meals, unless labeled low-sodium. ¨ Canned and dried soups, broths, and bouillon, unless labeled sodium-free or low-sodium. ¨ Canned vegetables, unless labeled sodium-free or low-sodium. ¨ [place] fries, pizza, tacos, and other fast foods.   ¨ Pickles, olives, ketchup, and other condiments, especially soy sauce, unless labeled sodium-free or low-sodium. Where can you learn more? Go to http://nereyda-micki.info/. Enter A031 in the search box to learn more about \"Low Sodium Diet (2,000 Milligram): Care Instructions. \"  Current as of: July 26, 2016  Content Version: 11.2  © 5710-4376 [employer]. Care instructions adapted under license by [employer] (which disclaims liability or warranty for this information). If you have questions about a medical condition or this instruction, always ask your healthcare professional. [place] [address] any warranty or liability for your use of this information. Deciding About Using Medicines To Quit Smoking  What are the medicines you can use? Your doctor may prescribe varenicline (Chantix) or bupropion (Zyban). These medicines can help you cope with cravings for tobacco. They are pills that don't contain nicotine. You also can use nicotine replacement products. These do contain nicotine. There are many types. · Gum and lozenges slowly release nicotine into your mouth. · Patches stick to your skin. They slowly release nicotine into your bloodstream.  · An inhaler has a gallagher that contains nicotine. You breathe in a puff of nicotine vapor through your mouth and throat. · Nasal spray releases a mist that contains nicotine. What are key points about this decision? · Using medicines can double your chances of quitting smoking. They can ease cravings and withdrawal symptoms. · Getting counseling along with using medicine can raise your chances of quitting even more. · If you smoke fewer than 5 cigarettes a day, you may not need medicines to help you quit smoking. · These medicines have less nicotine than cigarettes. And by itself, nicotine is not nearly as harmful as smoking. The tars, carbon monoxide, and other toxic chemicals in tobacco cause the harmful effects.   · The side effects of nicotine replacement products depend on the type of product. For example, a patch can make your skin red and itchy. Medicines in pill form can make you sick to your stomach. They can also cause dry mouth and trouble sleeping. For most people, the side effects are not bad enough to make them stop using the products. · FDA warning. The [place]. Food and Drug Administration (FDA) warns that people who are taking bupropion or varenicline and who have any serious or unusual changes in mood or behavior or who feel like hurting themselves or someone else should stop taking the medicine and call a doctor right away. If you already have a mood or behavior problem, be sure to tell your doctor before you decide to use these medicines. Why might you choose to use medicines to quit smoking? · You have tried on your own to stop smoking, but you were not able to stop. · You smoke more than 5 cigarettes a day. · You want to increase your chances of quitting smoking. · You want to reduce your cravings and withdrawal symptoms. · You feel the benefits of medicine outweigh the side effects. Why might you choose not to use medicine? · You want to try quitting on your own by stopping all at once (\"cold turkey\"). · You want to cut back slowly on the number of cigarettes you smoke. · You smoke fewer than 5 cigarettes a day. · You do not like using medicine. · You feel the side effects of medicines outweigh the benefits. · You are worried about the cost of medicines. Your decision  Thinking about the facts and your feelings can help you make a decision that is right for you. Be sure you understand the benefits and risks of your options, and think about what else you need to do before you make the decision. Where can you learn more? Go to http://nereyda-micki.info/. Enter H016 in the search box to learn more about \"Deciding About Using Medicines To Quit Smoking. \"  Current as of: May 26, 2016  Content Version: 11.2  © 8661-4108 [employer]. Care instructions adapted under license by [employer] (which disclaims liability or warranty for this information). If you have questions about a medical condition or this instruction, always ask your healthcare professional. [address] any warranty or liability for your use of this information. Learning About High Blood Pressure  What is high blood pressure? Blood pressure is a measure of how hard the blood pushes against the walls of your arteries. It's normal for blood pressure to go up and down throughout the day, but if it stays up, you have high blood pressure. Another name for high blood pressure is hypertension. Two numbers tell you your blood pressure. The first number is the systolic pressure. It shows how hard the blood pushes when your heart is pumping. The second number is the diastolic pressure. It shows how hard the blood pushes between heartbeats, when your heart is relaxed and filling with blood. A blood pressure of less than 120/80 (say \"120 over 80\") is ideal for an adult. High blood pressure is 140/90 or higher. You have high blood pressure if your top number is 140 or higher or your bottom number is 90 or higher, or both. Many people fall into the category in between, called prehypertension. People with prehypertension need to make lifestyle changes to bring their blood pressure down and help prevent or delay high blood pressure. What happens when you have high blood pressure? · Blood flows through your arteries with too much force. Over time, this damages the walls of your arteries. But you can't feel it. High blood pressure usually doesn't cause symptoms. · Fat and calcium start to build up in your arteries. This buildup is called plaque. Plaque makes your arteries narrower and stiffer. Blood can't flow through them as easily. · This lack of good blood flow starts to damage some of the organs in your body.  This can lead to problems such as coronary artery disease and heart attack, heart failure, stroke, kidney failure, and eye damage. How can you prevent high blood pressure? · Stay at a healthy weight. · Try to limit how much sodium you eat to less than 2,300 milligrams (mg) a day. If you limit your sodium to 1,500 mg a day, you can lower your blood pressure even more. ¨ Buy foods that are labeled \"unsalted,\" \"sodium-free,\" or \"low-sodium. \" Foods labeled \"reduced-sodium\" and \"light sodium\" may still have too much sodium. ¨ Flavor your food with garlic, lemon juice, onion, vinegar, herbs, and spices instead of salt. Do not use soy sauce, steak sauce, onion salt, garlic salt, mustard, or ketchup on your food. ¨ Use less salt (or none) when recipes call for it. You can often use half the salt a recipe calls for without losing flavor. · Be physically active. Get at least 30 minutes of exercise on most days of the week. Walking is a good choice. You also may want to do other activities, such as running, swimming, cycling, or playing tennis or team sports. · Limit alcohol to 2 drinks a day for men and 1 drink a day for women. · Eat plenty of fruits, vegetables, and low-fat dairy products. Eat less saturated and total fats. How is high blood pressure treated? · Your doctor will suggest making lifestyle changes. For example, your doctor may ask you to eat healthy foods, quit smoking, lose extra weight, and be more active. · If lifestyle changes don't help enough or your blood pressure is very high, you will have to take medicine every day. Follow-up care is a key part of your treatment and safety. Be sure to make and go to all appointments, and call your doctor if you are having problems. It's also a good idea to know your test results and keep a list of the medicines you take. Where can you learn more? Go to http://nereyda-micki.info/.   Enter P501 in the search box to learn more about \"Learning About High Blood Pressure. \"  Current as of: March 23, 2016  Content Version: 11.2  © 6751-4844 [employer] Care instructions adapted under license by [employer] (which disclaims liability or warranty for this information). If you have questions about a medical condition or this instruction, always ask your healthcare professional. [place] [address] any warranty or liability for your use of this information.

## 2017-05-01 NOTE — MR AVS SNAPSHOT
Visit Information Date & Time Provider Department Dept. Phone Encounter #  
 5/1/2017 11:30 AM Guillermina Zhang MD [address] [facility] [phone] [id number] Follow-up Instructions Return in about 6 months (around 11/1/2017), or if symptoms worsen or fail to improve. Follow-up and Disposition History Upcoming Health Maintenance Date Due Hepatitis C Screening 1955 FOBT Q 1 YEAR AGE 50-75 12/31/2007 INFLUENZA AGE 9 TO ADULT 8/1/2017 DTaP/Tdap/Td series (2 - Td) 5/1/2027 Allergies as of 5/1/2017  Review Complete On: 5/1/2017 By: Amanuel Schwarz LPN No Known Allergies Current Immunizations  Reviewed on 4/12/2017 Name Date Influenza Vaccine Split  Deferred (Patient Refused) Pneumococcal Vaccine (Unspecified Type)  Deferred (Patient Refused) Not reviewed this visit You Were Diagnosed With   
  
 Codes Comments HTN, goal below 130/80    -  Primary ICD-10-CM: I10 
ICD-9-CM: 401.9 Need for hepatitis C screening test     ICD-10-CM: Z11.59 
ICD-9-CM: V73.89 Screen for colon cancer     ICD-10-CM: Z12.11 ICD-9-CM: V69.52   
 CVA, old, speech/language deficit     ICD-10-CM: T94.878 ICD-9-CM: 438.10 Tobacco abuse disorder     ICD-10-CM: Z72.0 ICD-9-CM: 305.1 Personal history of nicotine dependence     ICD-10-CM: S67.071 ICD-9-CM: V15.82 Vitals BP Pulse Temp Resp Height(growth percentile) Weight(growth percentile) 158/81 (BP 1 Location: Left arm, BP Patient Position: At rest) 67 98.9 °F (37.2 °C) (Oral) 14 5' 8\" (1.727 m) 175 lb (79.4 kg) SpO2 BMI Smoking Status 98% 26.61 kg/m2 Current Every Day Smoker Vitals History BMI and BSA Data Body Mass Index Body Surface Area  
 26.61 kg/m 2 1.95 m 2 Preferred Pharmacy Pharmacy Name Phone [facility] [address] [facility] [address], [address] [facility] [phone] Your Updated Medication List  
  
   
This list is accurate as of: 5/1/17 12:19 PM.  Always use your most recent med list.  
  
  
  
  
 aspirin delayed-release 81 mg tablet Take 81 mg by mouth daily. losartan-hydroCHLOROthiazide 100-25 mg per tablet Commonly known as:  HYZAAR Take 1 Tab by mouth daily. rosuvastatin 10 mg tablet Commonly known as:  CRESTOR  
TAKE 1 TABLET BY MOUTH EVERY NIGHT Prescriptions Printed Refills  
 losartan-hydroCHLOROthiazide (HYZAAR) 100-25 mg per tablet 2 Sig: Take 1 Tab by mouth daily. Class: Print Route: Oral  
  
We Performed the Following HEPATITIS C AB [36247 CPT(R)] OCCULT BLOOD, IMMUNOASSAY (FIT) [id number] CPT(R)] Follow-up Instructions Return in about 6 months (around 11/1/2017), or if symptoms worsen or fail to improve. To-Do List   
 05/01/2017 Imaging:  CT LOW DOSE LUNG CANCER SCREENING Patient Instructions Learning About Benefits From Quitting Smoking How does quitting smoking make you healthier? If you're thinking about quitting smoking, you may have a few reasons to be smoke-free. Your health may be one of them. · When you quit smoking, you lower your risks for cancer, lung disease, heart attack, stroke, blood vessel disease, and blindness from macular degeneration. · When you're smoke-free, you get sick less often, and you heal faster. You are less likely to get colds, flu, bronchitis, and pneumonia. · As a nonsmoker, you may find that your mood is better and you are less stressed. When and how will you feel healthier? Quitting has real health benefits that start from day 1 of being smoke-free. And the longer you stay smoke-free, the healthier you get and the better you feel. The first hours · After just 20 minutes, your blood pressure and heart rate go down. That means there's less stress on your heart and blood vessels. · Within 12 hours, the level of carbon monoxide in your blood drops back to normal. That makes room for more oxygen. With more oxygen in your body, you may notice that you have more energy than when you smoked. After 2 weeks · Your lungs start to work better. · Your risk of heart attack starts to drop. After 1 month · When your lungs are clear, you cough less and breathe deeper, so it's easier to be active. · Your sense of taste and smell return. That means you can enjoy food more than you have since you started smoking. Over the years · After 1 year, your risk of heart disease is half what it would be if you kept smoking. · After 5 years, your risk of stroke starts to shrink. Within a few years after that, it's about the same as if you'd never smoked. · After 10 years, your risk of dying from lung cancer is cut by about half. And your risk for many other types of cancer is lower too. How would quitting help others in your life? When you quit smoking, you improve the health of everyone who now breathes in your smoke. · Their heart, lung, and cancer risks drop, much like yours. · They are sick less. For babies and small children, living smoke-free means they're less likely to have ear infections, pneumonia, and bronchitis. · If you're a woman who is or will be pregnant someday, quitting smoking means a healthier . · Children who are close to you are less likely to become adult smokers. Where can you learn more? Go to http://nereyda-micki.info/. Enter [id number] [id number] [id number] in the search box to learn more about \"Learning About Benefits From Quitting Smoking. \" Current as of: May 26, 2016 Content Version: 11.2 © 1853-1640 [employer]. Care instructions adapted under license by [employer] (which disclaims liability or warranty for this information).  If you have questions about a medical condition or this instruction, always ask your healthcare professional. [address] any warranty or liability for your use of this information. Learning About High Blood Pressure What is high blood pressure? Blood pressure is a measure of how hard the blood pushes against the walls of your arteries. It's normal for blood pressure to go up and down throughout the day, but if it stays up, you have high blood pressure. Another name for high blood pressure is hypertension. Two numbers tell you your blood pressure. The first number is the systolic pressure. It shows how hard the blood pushes when your heart is pumping. The second number is the diastolic pressure. It shows how hard the blood pushes between heartbeats, when your heart is relaxed and filling with blood. A blood pressure of less than 120/80 (say \"120 over 80\") is ideal for an adult. High blood pressure is 140/90 or higher. You have high blood pressure if your top number is 140 or higher or your bottom number is 90 or higher, or both. Many people fall into the category in between, called prehypertension. People with prehypertension need to make lifestyle changes to bring their blood pressure down and help prevent or delay high blood pressure. What happens when you have high blood pressure? · Blood flows through your arteries with too much force. Over time, this damages the walls of your arteries. But you can't feel it. High blood pressure usually doesn't cause symptoms. · Fat and calcium start to build up in your arteries. This buildup is called plaque. Plaque makes your arteries narrower and stiffer. Blood can't flow through them as easily. · This lack of good blood flow starts to damage some of the organs in your body. This can lead to problems such as coronary artery disease and heart attack, heart failure, stroke, kidney failure, and eye damage. How can you prevent high blood pressure? · Stay at a healthy weight. · Try to limit how much sodium you eat to less than 2,300 milligrams (mg) a day. If you limit your sodium to 1,500 mg a day, you can lower your blood pressure even more. ¨ Buy foods that are labeled \"unsalted,\" \"sodium-free,\" or \"low-sodium. \" Foods labeled \"reduced-sodium\" and \"light sodium\" may still have too much sodium. ¨ Flavor your food with garlic, lemon juice, onion, vinegar, herbs, and spices instead of salt. Do not use soy sauce, steak sauce, onion salt, garlic salt, mustard, or ketchup on your food. ¨ Use less salt (or none) when recipes call for it. You can often use half the salt a recipe calls for without losing flavor. · Be physically active. Get at least 30 minutes of exercise on most days of the week. Walking is a good choice. You also may want to do other activities, such as running, swimming, cycling, or playing tennis or team sports. · Limit alcohol to 2 drinks a day for men and 1 drink a day for women. · Eat plenty of fruits, vegetables, and low-fat dairy products. Eat less saturated and total fats. How is high blood pressure treated? · Your doctor will suggest making lifestyle changes. For example, your doctor may ask you to eat healthy foods, quit smoking, lose extra weight, and be more active. · If lifestyle changes don't help enough or your blood pressure is very high, you will have to take medicine every day. Follow-up care is a key part of your treatment and safety. Be sure to make and go to all appointments, and call your doctor if you are having problems. It's also a good idea to know your test results and keep a list of the medicines you take. Where can you learn more? Go to http://nereyda-micki.info/. Enter P501 in the search box to learn more about \"Learning About High Blood Pressure. \" Current as of: March 23, 2016 Content Version: 11.2 © 5545-2085 [employer].  Care instructions adapted under license by [address] (which disclaims liability or warranty for this information). If you have questions about a medical condition or this instruction, always ask your healthcare professional. [address] any warranty or liability for your use of this information. Deciding About Using Medicines To Quit Smoking What are the medicines you can use? Your doctor may prescribe varenicline (Chantix) or bupropion (Zyban). These medicines can help you cope with cravings for tobacco. They are pills that don't contain nicotine. You also can use nicotine replacement products. These do contain nicotine. There are many types. · Gum and lozenges slowly release nicotine into your mouth. · Patches stick to your skin. They slowly release nicotine into your bloodstream. 
· An inhaler has a gallagher that contains nicotine. You breathe in a puff of nicotine vapor through your mouth and throat. · Nasal spray releases a mist that contains nicotine. What are key points about this decision? · Using medicines can double your chances of quitting smoking. They can ease cravings and withdrawal symptoms. · Getting counseling along with using medicine can raise your chances of quitting even more. · If you smoke fewer than 5 cigarettes a day, you may not need medicines to help you quit smoking. · These medicines have less nicotine than cigarettes. And by itself, nicotine is not nearly as harmful as smoking. The tars, carbon monoxide, and other toxic chemicals in tobacco cause the harmful effects. · The side effects of nicotine replacement products depend on the type of product. For example, a patch can make your skin red and itchy. Medicines in pill form can make you sick to your stomach. They can also cause dry mouth and trouble sleeping. For most people, the side effects are not bad enough to make them stop using the products. · FDA warning. The U.S. Food and Drug Administration (FDA) warns that people who are taking bupropion or varenicline and who have any serious or unusual changes in mood or behavior or who feel like hurting themselves or someone else should stop taking the medicine and call a doctor right away. If you already have a mood or behavior problem, be sure to tell your doctor before you decide to use these medicines. Why might you choose to use medicines to quit smoking? · You have tried on your own to stop smoking, but you were not able to stop. · You smoke more than 5 cigarettes a day. · You want to increase your chances of quitting smoking. · You want to reduce your cravings and withdrawal symptoms. · You feel the benefits of medicine outweigh the side effects. Why might you choose not to use medicine? · You want to try quitting on your own by stopping all at once (\"cold turkey\"). · You want to cut back slowly on the number of cigarettes you smoke. · You smoke fewer than 5 cigarettes a day. · You do not like using medicine. · You feel the side effects of medicines outweigh the benefits. · You are worried about the cost of medicines. Your decision Thinking about the facts and your feelings can help you make a decision that is right for you. Be sure you understand the benefits and risks of your options, and think about what else you need to do before you make the decision. Where can you learn more? Go to http://nereyda-micki.info/. Enter Y229 in the search box to learn more about \"Deciding About Using Medicines To Quit Smoking. \" Current as of: May 26, 2016 Content Version: 11.2 © 0746-8512 [employer]. Care instructions adapted under license by [employer] (which disclaims liability or warranty for this information).  If you have questions about a medical condition or this instruction, always ask your healthcare professional. Wendy [employer] disclaims any warranty or liability for your use of this information. Learning About High Blood Pressure What is high blood pressure? Blood pressure is a measure of how hard the blood pushes against the walls of your arteries. It's normal for blood pressure to go up and down throughout the day, but if it stays up, you have high blood pressure. Another name for high blood pressure is hypertension. Two numbers tell you your blood pressure. The first number is the systolic pressure. It shows how hard the blood pushes when your heart is pumping. The second number is the diastolic pressure. It shows how hard the blood pushes between heartbeats, when your heart is relaxed and filling with blood. A blood pressure of less than 120/80 (say \"120 over 80\") is ideal for an adult. High blood pressure is 140/90 or higher. You have high blood pressure if your top number is 140 or higher or your bottom number is 90 or higher, or both. Many people fall into the category in between, called prehypertension. People with prehypertension need to make lifestyle changes to bring their blood pressure down and help prevent or delay high blood pressure. What happens when you have high blood pressure? · Blood flows through your arteries with too much force. Over time, this damages the walls of your arteries. But you can't feel it. High blood pressure usually doesn't cause symptoms. · Fat and calcium start to build up in your arteries. This buildup is called plaque. Plaque makes your arteries narrower and stiffer. Blood can't flow through them as easily. · This lack of good blood flow starts to damage some of the organs in your body. This can lead to problems such as coronary artery disease and heart attack, heart failure, stroke, kidney failure, and eye damage. How can you prevent high blood pressure? · Stay at a healthy weight.  
· Try to limit how much sodium you eat to less than 2,300 milligrams (mg) a day. If you limit your sodium to 1,500 mg a day, you can lower your blood pressure even more. ¨ Buy foods that are labeled \"unsalted,\" \"sodium-free,\" or \"low-sodium. \" Foods labeled \"reduced-sodium\" and \"light sodium\" may still have too much sodium. ¨ Flavor your food with garlic, lemon juice, onion, vinegar, herbs, and spices instead of salt. Do not use soy sauce, steak sauce, onion salt, garlic salt, mustard, or ketchup on your food. ¨ Use less salt (or none) when recipes call for it. You can often use half the salt a recipe calls for without losing flavor. · Be physically active. Get at least 30 minutes of exercise on most days of the week. Walking is a good choice. You also may want to do other activities, such as running, swimming, cycling, or playing tennis or team sports. · Limit alcohol to 2 drinks a day for men and 1 drink a day for women. · Eat plenty of fruits, vegetables, and low-fat dairy products. Eat less saturated and total fats. How is high blood pressure treated? · Your doctor will suggest making lifestyle changes. For example, your doctor may ask you to eat healthy foods, quit smoking, lose extra weight, and be more active. · If lifestyle changes don't help enough or your blood pressure is very high, you will have to take medicine every day. Follow-up care is a key part of your treatment and safety. Be sure to make and go to all appointments, and call your doctor if you are having problems. It's also a good idea to know your test results and keep a list of the medicines you take. Where can you learn more? Go to http://nereyda-micki.info/. Enter P501 in the search box to learn more about \"Learning About High Blood Pressure. \" Current as of: March 23, 2016 Content Version: 11.2 © 8837-7119 [employer].  Care instructions adapted under license by [employer] (which disclaims liability or warranty for this information). If you have questions about a medical condition or this instruction, always ask your healthcare professional. [address] any warranty or liability for your use of this information. Colon Cancer Screening: Care Instructions Your Care Instructions Colorectal cancer occurs in the colon or rectum. That's the lower part of your digestive system. It is the second-leading cause of cancer deaths in the [place]. It often starts with small growths called polyps in the colon or rectum. Polyps are usually found with screening tests. Depending on the type of test, any polyps found may be removed during the tests. Colorectal cancer usually does not cause symptoms at first. But regular tests can help find it early, before it spreads and becomes harder to treat. Experts advise routine tests for colon cancer for people starting at age 48. And they advise people with a higher risk of colon cancer to get tested sooner. Talk with your doctor about when you should start testing. Discuss which tests you need. Follow-up care is a key part of your treatment and safety. Be sure to make and go to all appointments, and call your doctor if you are having problems. It's also a good idea to know your test results and keep a list of the medicines you take. What are the main screening tests for colon cancer? · Stool tests. These include the fecal immunochemical test (FIT) and the fecal occult blood test (FOBT). These tests check stool samples for signs of cancer. If your test is positive, you will need to have a colonoscopy. · Sigmoidoscopy. This test lets your doctor look at the lining of your rectum and the lowest part of your colon. Your doctor uses a lighted tube called a sigmoidoscope. This test can't find cancers or polyps in the upper part of your colon. In some cases, polyps that are found can be removed.  But if your doctor finds polyps, you will need to have a colonoscopy to check the upper part of your colon. · Colonoscopy. This test lets your doctor look at the lining of your rectum and your entire colon. The doctor uses a thin, flexible tool called a colonoscope. It can also be used to remove polyps or get a tissue sample (biopsy). What tests do you need? The following guidelines are for people age 48 and over who are not at high risk for colorectal cancer. You may have at least one of these tests as directed by your doctor. · Fecal immunochemical test (FIT) or fecal occult blood test (FOBT) every year · Sigmoidoscopy every 5 years · Colonoscopy every 10 years If you are age 76 and have had regular screenings or are age [de-identified] or older, you may not need screening. Talk with your doctor about when you need to be tested. And discuss which tests are right for you. Your doctor may recommend earlier or more frequent testing if you: 
· Have had colorectal cancer before. · Have had colon polyps. · Have symptoms of colorectal cancer. These include blood in your stool and changes in your bowel habits. · Have a parent, brother or sister, or child with colon polyps or colorectal cancer. · Have a bowel disease. This includes ulcerative colitis and Crohn's disease. · Have a rare polyp syndrome that runs in families, such as familial adenomatous polyposis (FAP). · Have had radiation treatments to the belly or pelvis. When should you call for help? Call your doctor now or seek immediate medical care if: 
· Your stools are black and tarlike or have streaks of blood. · You have pain or tenderness in your lower belly. Watch closely for changes in your health, and be sure to contact your doctor if: 
· You have diarrhea, constipation, or another change in bowel habits that does not get better. · Your stools are narrower than usual. 
· You lose weight and you don't know why. · You have any questions about your screening tests or schedule. Where can you learn more? Go to http://nereyda-micki.info/. Enter M541 in the search box to learn more about \"Colon Cancer Screening: Care Instructions. \" Current as of: July 26, 2016 Content Version: 11.2 © 8279-3831 [employer]. Care instructions adapted under license by [employer] (which disclaims liability or warranty for this information). If you have questions about a medical condition or this instruction, always ask your healthcare professional. [address] any warranty or liability for your use of this information. DASH Diet: Care Instructions Your Care Instructions The DASH diet is an eating plan that can help lower your blood pressure. DASH stands for Dietary Approaches to Stop Hypertension. Hypertension is high blood pressure. The DASH diet focuses on eating foods that are high in calcium, potassium, and magnesium. These nutrients can lower blood pressure. The foods that are highest in these nutrients are fruits, vegetables, low-fat dairy products, nuts, seeds, and legumes. But taking calcium, potassium, and magnesium supplements instead of eating foods that are high in those nutrients does not have the same effect. The DASH diet also includes whole grains, fish, and poultry. The DASH diet is one of several lifestyle changes your doctor may recommend to lower your high blood pressure. Your doctor may also want you to decrease the amount of sodium in your diet. Lowering sodium while following the DASH diet can lower blood pressure even further than just the DASH diet alone. Follow-up care is a key part of your treatment and safety. Be sure to make and go to all appointments, and call your doctor if you are having problems. It's also a good idea to know your test results and keep a list of the medicines you take. How can you care for yourself at home? Following the DASH diet · Eat 4 to 5 servings of fruit each day.  A serving is 1 medium-sized piece of fruit, ½ cup chopped or canned fruit, 1/4 cup dried fruit, or 4 ounces (½ cup) of fruit juice. Choose fruit more often than fruit juice. · Eat 4 to 5 servings of vegetables each day. A serving is 1 cup of lettuce or raw leafy vegetables, ½ cup of chopped or cooked vegetables, or 4 ounces (½ cup) of vegetable juice. Choose vegetables more often than vegetable juice. · Get 2 to 3 servings of low-fat and fat-free dairy each day. A serving is 8 ounces of milk, 1 cup of yogurt, or 1 ½ ounces of cheese. · Eat 6 to 8 servings of grains each day. A serving is 1 slice of bread, 1 ounce of dry cereal, or ½ cup of cooked rice, pasta, or cooked cereal. Try to choose whole-grain products as much as possible. · Limit lean meat, poultry, and fish to 2 servings each day. A serving is 3 ounces, about the size of a deck of cards. · Eat 4 to 5 servings of nuts, seeds, and legumes (cooked dried beans, lentils, and split peas) each week. A serving is 1/3 cup of nuts, 2 tablespoons of seeds, or ½ cup of cooked beans or peas. · Limit fats and oils to 2 to 3 servings each day. A serving is 1 teaspoon of vegetable oil or 2 tablespoons of salad dressing. · Limit sweets and added sugars to 5 servings or less a week. A serving is 1 tablespoon jelly or jam, ½ cup sorbet, or 1 cup of lemonade. · Eat less than 2,300 milligrams (mg) of sodium a day. If you limit your sodium to 1,500 mg a day, you can lower your blood pressure even more. Tips for success · Start small. Do not try to make dramatic changes to your diet all at once. You might feel that you are missing out on your favorite foods and then be more likely to not follow the plan. Make small changes, and stick with them. Once those changes become habit, add a few more changes. · Try some of the following: ¨ Make it a goal to eat a fruit or vegetable at every meal and at snacks. This will make it easy to get the recommended amount of fruits and vegetables each day. ¨ Try yogurt topped with fruit and nuts for a snack or healthy dessert. ¨ Add lettuce, tomato, cucumber, and onion to sandwiches. ¨ Combine a ready-made pizza crust with low-fat mozzarella cheese and lots of vegetable toppings. Try using tomatoes, squash, spinach, broccoli, carrots, cauliflower, and onions. ¨ Have a variety of cut-up vegetables with a low-fat dip as an appetizer instead of chips and dip. ¨ Sprinkle sunflower seeds or chopped almonds over salads. Or try adding chopped walnuts or almonds to cooked vegetables. ¨ Try some vegetarian meals using beans and peas. Add garbanzo or kidney beans to salads. Make burritos and tacos with mashed asher beans or black beans. Where can you learn more? Go to http://nereydaNines Photovoltaicmicki.info/. Enter O185 in the search box to learn more about \"DASH Diet: Care Instructions. \" Current as of: March 23, 2016 Content Version: 11.2 © 4819-9050 [employer]. Care instructions adapted under license by [employer] (which disclaims liability or warranty for this information). If you have questions about a medical condition or this instruction, always ask your healthcare professional. [address] any warranty or liability for your use of this information. High Blood Pressure: Care Instructions Your Care Instructions If your blood pressure is usually above 140/90, you have high blood pressure, or hypertension. That means the top number is 140 or higher or the bottom number is 90 or higher, or both. Despite what a lot of people think, high blood pressure usually doesn't cause headaches or make you feel dizzy or lightheaded. It usually has no symptoms. But it does increase your risk for heart attack, stroke, and kidney or eye damage. The higher your blood pressure, the more your risk increases. Your doctor will give you a goal for your blood pressure.  Your goal will be based on your health and your age. An example of a goal is to keep your blood pressure below 140/90. Lifestyle changes, such as eating healthy and being active, are always important to help lower blood pressure. You might also take medicine to reach your blood pressure goal. 
Follow-up care is a key part of your treatment and safety. Be sure to make and go to all appointments, and call your doctor if you are having problems. It's also a good idea to know your test results and keep a list of the medicines you take. How can you care for yourself at home? Medical treatment · If you stop taking your medicine, your blood pressure will go back up. You may take one or more types of medicine to lower your blood pressure. Be safe with medicines. Take your medicine exactly as prescribed. Call your doctor if you think you are having a problem with your medicine. · Talk to your doctor before you start taking aspirin every day. Aspirin can help certain people lower their risk of a heart attack or stroke. But taking aspirin isn't right for everyone, because it can cause serious bleeding. · See your doctor regularly. You may need to see the doctor more often at first or until your blood pressure comes down. · If you are taking blood pressure medicine, talk to your doctor before you take decongestants or anti-inflammatory medicine, such as ibuprofen. Some of these medicines can raise blood pressure. · Learn how to check your blood pressure at home. Lifestyle changes · Stay at a healthy weight. This is especially important if you put on weight around the waist. Losing even 10 pounds can help you lower your blood pressure. · If your doctor recommends it, get more exercise. Walking is a good choice. Bit by bit, increase the amount you walk every day. Try for at least 30 minutes on most days of the week. You also may want to swim, bike, or do other activities. · Avoid or limit alcohol. Talk to your doctor about whether you can drink any alcohol. · Try to limit how much sodium you eat to less than 2,300 milligrams (mg) a day. Your doctor may ask you to try to eat less than 1,500 mg a day. · Eat plenty of fruits (such as bananas and oranges), vegetables, legumes, whole grains, and low-fat dairy products. · Lower the amount of saturated fat in your diet. Saturated fat is found in animal products such as milk, cheese, and meat. Limiting these foods may help you lose weight and also lower your risk for heart disease. · Do not smoke. Smoking increases your risk for heart attack and stroke. If you need help quitting, talk to your doctor about stop-smoking programs and medicines. These can increase your chances of quitting for good. When should you call for help? Call 911 anytime you think you may need emergency care. This may mean having symptoms that suggest that your blood pressure is causing a serious heart or blood vessel problem. Your blood pressure may be over 180/110. For example, call 911 if: 
· You have symptoms of a heart attack. These may include: ¨ Chest pain or pressure, or a strange feeling in the chest. 
¨ Sweating. ¨ Shortness of breath. ¨ Nausea or vomiting. ¨ Pain, pressure, or a strange feeling in the back, neck, jaw, or upper belly or in one or both shoulders or arms. ¨ Lightheadedness or sudden weakness. ¨ A fast or irregular heartbeat. · You have symptoms of a stroke. These may include: 
¨ Sudden numbness, tingling, weakness, or loss of movement in your face, arm, or leg, especially on only one side of your body. ¨ Sudden vision changes. ¨ Sudden trouble speaking. ¨ Sudden confusion or trouble understanding simple statements. ¨ Sudden problems with walking or balance. ¨ A sudden, severe headache that is different from past headaches. · You have severe back or belly pain. Do not wait until your blood pressure comes down on its own. Get help right away. Call your doctor now or seek immediate care if: 
· Your blood pressure is much higher than normal (such as 180/110 or higher), but you don't have symptoms. · You think high blood pressure is causing symptoms, such as: ¨ Severe headache. ¨ Blurry vision. Watch closely for changes in your health, and be sure to contact your doctor if: 
· Your blood pressure measures 140/90 or higher at least 2 times. That means the top number is 140 or higher or the bottom number is 90 or higher, or both. · You think you may be having side effects from your blood pressure medicine. · Your blood pressure is usually normal, but it goes above normal at least 2 times. Where can you learn more? Go to http://nereyda-micki.info/. Enter F611 in the search box to learn more about \"High Blood Pressure: Care Instructions. \" Current as of: August 8, 2016 Content Version: 11.2 © 9167-2169 BuildOut. Care instructions adapted under license by [email or web address] (which disclaims liability or warranty for this information). If you have questions about a medical condition or this instruction, always ask your healthcare professional. [place] [address] any warranty or liability for your use of this information. Low Sodium Diet (2,000 Milligram): Care Instructions Your Care Instructions Too much sodium causes your body to hold on to extra water. This can raise your blood pressure and force your heart and kidneys to work harder. In very serious cases, this could cause you to be put in the hospital. It might even be life-threatening. By limiting sodium, you will feel better and lower your risk of serious problems. The most common source of sodium is salt. People get most of the salt in their diet from canned, prepared, and packaged foods.  Fast food and restaurant meals also are very high in sodium. Your doctor will probably limit your sodium to less than 2,000 milligrams (mg) a day. This limit counts all the sodium in prepared and packaged foods and any salt you add to your food. Follow-up care is a key part of your treatment and safety. Be sure to make and go to all appointments, and call your doctor if you are having problems. It's also a good idea to know your test results and keep a list of the medicines you take. How can you care for yourself at home? Read food labels · Read labels on cans and food packages. The labels tell you how much sodium is in each serving. Make sure that you look at the serving size. If you eat more than the serving size, you have eaten more sodium. · Food labels also tell you the Percent Daily Value for sodium. Choose products with low Percent Daily Values for sodium. · Be aware that sodium can come in forms other than salt, including monosodium glutamate (MSG), sodium citrate, and sodium bicarbonate (baking soda). MSG is often added to Asian food. When you eat out, you can sometimes ask for food without MSG or added salt. Buy low-sodium foods · Buy foods that are labeled \"unsalted\" (no salt added), \"sodium-free\" (less than 5 mg of sodium per serving), or \"low-sodium\" (less than 140 mg of sodium per serving). Foods labeled \"reduced-sodium\" and \"light sodium\" may still have too much sodium. Be sure to read the label to see how much sodium you are getting. · Buy fresh vegetables, or frozen vegetables without added sauces. Buy low-sodium versions of canned vegetables, soups, and other canned goods. Prepare low-sodium meals · Cut back on the amount of salt you use in cooking. This will help you adjust to the taste. Do not add salt after cooking. One teaspoon of salt has about 2,300 mg of sodium. · Take the salt shaker off the table.  
· Flavor your food with garlic, lemon juice, onion, vinegar, herbs, and spices. Do not use soy sauce, lite soy sauce, steak sauce, onion salt, garlic salt, celery salt, mustard, or ketchup on your food. · Use low-sodium salad dressings, sauces, and ketchup. Or make your own salad dressings and sauces without adding salt. · Use less salt (or none) when recipes call for it. You can often use half the salt a recipe calls for without losing flavor. Other foods such as rice, pasta, and grains do not need added salt. · Rinse canned vegetables, and cook them in fresh water. This removes somebut not allof the salt. · Avoid water that is naturally high in sodium or that has been treated with water softeners, which add sodium. Call your local water company to find out the sodium content of your water supply. If you buy bottled water, read the label and choose a sodium-free brand. Avoid high-sodium foods · Avoid eating: ¨ Smoked, cured, salted, and canned meat, fish, and poultry. ¨ Ham, davila, hot dogs, and luncheon meats. ¨ Regular, hard, and processed cheese and regular peanut butter. ¨ Crackers with salted tops, and other salted snack foods such as pretzels, chips, and salted popcorn. ¨ Frozen prepared meals, unless labeled low-sodium. ¨ Canned and dried soups, broths, and bouillon, unless labeled sodium-free or low-sodium. ¨ Canned vegetables, unless labeled sodium-free or low-sodium. ¨ Western Sarahy fries, pizza, tacos, and other fast foods. ¨ Pickles, olives, ketchup, and other condiments, especially soy sauce, unless labeled sodium-free or low-sodium. Where can you learn more? Go to http://nereyda-micki.info/. Enter R191 in the search box to learn more about \"Low Sodium Diet (2,000 Milligram): Care Instructions. \" Current as of: July 26, 2016 Content Version: 11.2 © 3458-9794 StepOne.  Care instructions adapted under license by [employer] (which disclaims liability or warranty for this information). If you have questions about a medical condition or this instruction, always ask your healthcare professional. [address] any warranty or liability for your use of this information. Deciding About Using Medicines To Quit Smoking What are the medicines you can use? Your doctor may prescribe varenicline (Chantix) or bupropion (Zyban). These medicines can help you cope with cravings for tobacco. They are pills that don't contain nicotine. You also can use nicotine replacement products. These do contain nicotine. There are many types. · Gum and lozenges slowly release nicotine into your mouth. · Patches stick to your skin. They slowly release nicotine into your bloodstream. 
· An inhaler has a gallagher that contains nicotine. You breathe in a puff of nicotine vapor through your mouth and throat. · Nasal spray releases a mist that contains nicotine. What are key points about this decision? · Using medicines can double your chances of quitting smoking. They can ease cravings and withdrawal symptoms. · Getting counseling along with using medicine can raise your chances of quitting even more. · If you smoke fewer than 5 cigarettes a day, you may not need medicines to help you quit smoking. · These medicines have less nicotine than cigarettes. And by itself, nicotine is not nearly as harmful as smoking. The tars, carbon monoxide, and other toxic chemicals in tobacco cause the harmful effects. · The side effects of nicotine replacement products depend on the type of product. For example, a patch can make your skin red and itchy. Medicines in pill form can make you sick to your stomach. They can also cause dry mouth and trouble sleeping. For most people, the side effects are not bad enough to make them stop using the products. · FDA warning. The [place]  Food and Drug Administration (FDA) warns that people who are taking bupropion or varenicline and who have any serious or unusual changes in mood or behavior or who feel like hurting themselves or someone else should stop taking the medicine and call a doctor right away. If you already have a mood or behavior problem, be sure to tell your doctor before you decide to use these medicines. Why might you choose to use medicines to quit smoking? · You have tried on your own to stop smoking, but you were not able to stop. · You smoke more than 5 cigarettes a day. · You want to increase your chances of quitting smoking. · You want to reduce your cravings and withdrawal symptoms. · You feel the benefits of medicine outweigh the side effects. Why might you choose not to use medicine? · You want to try quitting on your own by stopping all at once (\"cold turkey\"). · You want to cut back slowly on the number of cigarettes you smoke. · You smoke fewer than 5 cigarettes a day. · You do not like using medicine. · You feel the side effects of medicines outweigh the benefits. · You are worried about the cost of medicines. Your decision Thinking about the facts and your feelings can help you make a decision that is right for you. Be sure you understand the benefits and risks of your options, and think about what else you need to do before you make the decision. Where can you learn more? Go to http://nereyda-micki.info/. Enter E656 in the search box to learn more about \"Deciding About Using Medicines To Quit Smoking. \" Current as of: May 26, 2016 Content Version: 11.2 © 6744-0045 [employer]. Care instructions adapted under license by [employer] (which disclaims liability or warranty for this information). If you have questions about a medical condition or this instruction, always ask your healthcare professional. [address] any warranty or liability for your use of this information. Learning About High Blood Pressure What is high blood pressure? Blood pressure is a measure of how hard the blood pushes against the walls of your arteries. It's normal for blood pressure to go up and down throughout the day, but if it stays up, you have high blood pressure. Another name for high blood pressure is hypertension. Two numbers tell you your blood pressure. The first number is the systolic pressure. It shows how hard the blood pushes when your heart is pumping. The second number is the diastolic pressure. It shows how hard the blood pushes between heartbeats, when your heart is relaxed and filling with blood. A blood pressure of less than 120/80 (say \"120 over 80\") is ideal for an adult. High blood pressure is 140/90 or higher. You have high blood pressure if your top number is 140 or higher or your bottom number is 90 or higher, or both. Many people fall into the category in between, called prehypertension. People with prehypertension need to make lifestyle changes to bring their blood pressure down and help prevent or delay high blood pressure. What happens when you have high blood pressure? · Blood flows through your arteries with too much force. Over time, this damages the walls of your arteries. But you can't feel it. High blood pressure usually doesn't cause symptoms. · Fat and calcium start to build up in your arteries. This buildup is called plaque. Plaque makes your arteries narrower and stiffer. Blood can't flow through them as easily. · This lack of good blood flow starts to damage some of the organs in your body. This can lead to problems such as coronary artery disease and heart attack, heart failure, stroke, kidney failure, and eye damage. How can you prevent high blood pressure? · Stay at a healthy weight. · Try to limit how much sodium you eat to less than 2,300 milligrams (mg) a day. If you limit your sodium to 1,500 mg a day, you can lower your blood pressure even more. ¨ Buy foods that are labeled \"unsalted,\" \"sodium-free,\" or \"low-sodium. \" Foods labeled \"reduced-sodium\" and \"light sodium\" may still have too much sodium. ¨ Flavor your food with garlic, lemon juice, onion, vinegar, herbs, and spices instead of salt. Do not use soy sauce, steak sauce, onion salt, garlic salt, mustard, or ketchup on your food. ¨ Use less salt (or none) when recipes call for it. You can often use half the salt a recipe calls for without losing flavor. · Be physically active. Get at least 30 minutes of exercise on most days of the week. Walking is a good choice. You also may want to do other activities, such as running, swimming, cycling, or playing tennis or team sports. · Limit alcohol to 2 drinks a day for men and 1 drink a day for women. · Eat plenty of fruits, vegetables, and low-fat dairy products. Eat less saturated and total fats. How is high blood pressure treated? · Your doctor will suggest making lifestyle changes. For example, your doctor may ask you to eat healthy foods, quit smoking, lose extra weight, and be more active. · If lifestyle changes don't help enough or your blood pressure is very high, you will have to take medicine every day. Follow-up care is a key part of your treatment and safety. Be sure to make and go to all appointments, and call your doctor if you are having problems. It's also a good idea to know your test results and keep a list of the medicines you take. Where can you learn more? Go to http://nereyda-micki.info/. Enter P501 in the search box to learn more about \"Learning About High Blood Pressure. \" Current as of: March 23, 2016 Content Version: 11.2 © 4415-8665 [employer]enty. Care instructions adapted under license by [employer] (which disclaims liability or warranty for this information).  If you have questions about a medical condition or this instruction, always ask your healthcare professional. G[employer] disclaims any warranty or liability for your use of this information. Patient Instructions History Introducing [facility]! Kaya Lucio introduces [employer] patient portal. Now you can access parts of your medical record, email your doctor's office, and request medication refills online. 1. In your internet browser, go to [email or web address] 2. Click on the First Time User? Click Here link in the Sign In box. You will see the New Member Sign Up page. 3. Enter your [employer] Access Code exactly as it appears below. You will not need to use this code after youve completed the sign-up process. If you do not sign up before the expiration date, you must request a new code. · [employer] Access Code: [email or web address] Expires: 7/11/2017 10:47 AM 
 
4. Enter the last four digits of your Social Security Number (xxxx) and Date of Birth (mm/dd/yyyy) as indicated and click Submit. You will be taken to the next sign-up page. 5. Create a [employer] ID. This will be your [employer] login ID and cannot be changed, so think of one that is secure and easy to remember. 6. Create a [employer] password. You can change your password at any time. 7. Enter your Password Reset Question and Answer. This can be used at a later time if you forget your password. 8. Enter your e-mail address. You will receive e-mail notification when new information is available in [address] 9. Click Sign Up. You can now view and download portions of your medical record. 10. Click the Download Summary menu link to download a portable copy of your medical information. If you have questions, please visit the Frequently Asked Questions section of the [employer] website. Remember, [employer] is NOT to be used for urgent needs. For medical emergencies, dial 911. Now available from your iPhone and Android! Please provide this summary of care documentation to your next provider. Your primary care clinician is listed as ALLIE Hernández. If you have any questions after today's visit, please call [phone].

## 2017-05-02 LAB — HCV AB S/CO SERPL IA: <0.1 S/CO RATIO (ref 0–0.9)

## 2017-05-10 LAB — HEMOCCULT STL QL IA: NEGATIVE

## 2017-12-28 ENCOUNTER — OFFICE VISIT (OUTPATIENT)
Dept: FAMILY MEDICINE CLINIC | Age: 62
End: 2017-12-28

## 2017-12-28 VITALS
SYSTOLIC BLOOD PRESSURE: 153 MMHG | HEIGHT: 68 IN | WEIGHT: 178 LBS | OXYGEN SATURATION: 99 % | TEMPERATURE: 96.5 F | BODY MASS INDEX: 26.98 KG/M2 | RESPIRATION RATE: 16 BRPM | DIASTOLIC BLOOD PRESSURE: 95 MMHG | HEART RATE: 56 BPM

## 2017-12-28 DIAGNOSIS — I10 HTN, GOAL BELOW 130/80: ICD-10-CM

## 2017-12-28 DIAGNOSIS — I69.328 CVA, OLD, SPEECH/LANGUAGE DEFICIT: Primary | ICD-10-CM

## 2017-12-28 DIAGNOSIS — E78.00 HYPERCHOLESTEROLEMIA: ICD-10-CM

## 2017-12-28 DIAGNOSIS — Z72.0 TOBACCO ABUSE DISORDER: ICD-10-CM

## 2017-12-28 RX ORDER — AMLODIPINE BESYLATE 5 MG/1
TABLET ORAL
Qty: 90 TAB | Refills: 2 | Status: SHIPPED | OUTPATIENT
Start: 2017-12-28 | End: 2018-01-25 | Stop reason: SDUPTHER

## 2017-12-28 RX ORDER — ROSUVASTATIN CALCIUM 20 MG/1
TABLET, COATED ORAL
Qty: 90 TAB | Refills: 1 | Status: SHIPPED | OUTPATIENT
Start: 2017-12-28 | End: 2018-11-28 | Stop reason: SDUPTHER

## 2017-12-28 RX ORDER — LOSARTAN POTASSIUM AND HYDROCHLOROTHIAZIDE 25; 100 MG/1; MG/1
1 TABLET ORAL DAILY
Qty: 90 TAB | Refills: 2
Start: 2017-12-28 | End: 2019-05-28 | Stop reason: SDUPTHER

## 2017-12-28 RX ORDER — BUPROPION HYDROCHLORIDE 100 MG/1
100 TABLET ORAL DAILY
Qty: 30 TAB | Refills: 3 | Status: SHIPPED | OUTPATIENT
Start: 2017-12-28 | End: 2017-12-28 | Stop reason: SDUPTHER

## 2017-12-28 RX ORDER — BUPROPION HYDROCHLORIDE 100 MG/1
TABLET ORAL
Qty: 90 TAB | Refills: 3 | Status: SHIPPED | OUTPATIENT
Start: 2017-12-28 | End: 2018-01-25 | Stop reason: ALTCHOICE

## 2017-12-28 RX ORDER — AMLODIPINE BESYLATE 5 MG/1
5 TABLET ORAL DAILY
Qty: 30 TAB | Refills: 2 | Status: SHIPPED | OUTPATIENT
Start: 2017-12-28 | End: 2017-12-28 | Stop reason: SDUPTHER

## 2017-12-28 NOTE — PROGRESS NOTES
HISTORY OF PRESENT ILLNESS  Abraham Schroeder is a 63 y.o. male. HPI   Present wit hx of CAD for which pt denies and hx of CVA in 2010, unfortunately he is still smokes, but slowing down, never done any med but would like to do the best to get rid of it      HTN  Today pt present for Bp check and the patient stating that so far there has been a Compliancy w/ the bp meds,  He is having had the low salt diet,   the patient has been active , he does do the daily walking with the cane and its weakness with the rt side upper and lower, no LOC no fall  pt states that patien does not  obtain the bp at home,  On mod dose statin tx tolerating it nicely no myalgia at this time  today the pt denies Chest Pain, has no legs swelling no lightheadedness,  the pat has not been feeling anxious, and  Has not been feeling stressed out,   otherwise feeling better since the last visit        Current Outpatient Prescriptions   Medication Sig Dispense Refill    losartan-hydroCHLOROthiazide (HYZAAR) 100-25 mg per tablet Take 1 Tab by mouth daily. 90 Tab 2    aspirin delayed-release 81 mg tablet Take 81 mg by mouth daily.  rosuvastatin (CRESTOR) 10 mg tablet TAKE 1 TABLET BY MOUTH EVERY NIGHT 90 Tab 11     No Known Allergies  Past Medical History:   Diagnosis Date    Acute MI, inferoposterior wall, initial episode of care ([address] 4/22/2011    Arthritis     CAD (coronary artery disease) 4/23/2011    CAD (coronary artery disease), native coronary artery 4/22/2011    Chronic pain     CVA, old, speech/language deficit 4/12/2017    HTN, goal below 130/80 4/12/2017    Hypercholesterolemia 4/12/2017    Hypertension 4/22/2011    Stroke ([address]     Tobacco abuse disorder 4/12/2017    Unstable angina ([address] 4/22/2011     History reviewed. No pertinent surgical history.   Family History   Problem Relation Age of Onset    Dementia Mother     Emphysema Father     Hypertension Father      Social History   Substance Use Topics    Smoking status: Current Every Day Smoker     Packs/day: 1.00     Years: 35.00    Smokeless tobacco: Never Used      Comment: 2 packs of cigs last 1 month    Alcohol use No      Lab Results  Component Value Date/Time   WBC 6.6 04/12/2017 10:49 AM   HGB 10.9 04/12/2017 10:49 AM   HCT 35.7 04/12/2017 10:49 AM   PLATELET 102 90/36/1825 10:49 AM   MCV 82 04/12/2017 10:49 AM     Lab Results  Component Value Date/Time   TSH 1.030 04/12/2017 10:49 AM         Review of Systems   Constitutional: Negative for chills and fever. HENT: Negative for ear pain and nosebleeds. Eyes: Negative for blurred vision, pain and discharge. Respiratory: Negative for shortness of breath. Cardiovascular: Negative for chest pain and leg swelling. Gastrointestinal: Negative for constipation, diarrhea, nausea and vomiting. Genitourinary: Negative for frequency. Musculoskeletal: Negative for joint pain. Skin: Negative for itching and rash. Neurological: Negative for headaches. Psychiatric/Behavioral: Negative for depression. The patient is not nervous/anxious. Physical Exam   Constitutional: He is oriented to person, place, and time. He appears well-developed and well-nourished. HENT:   Head: Normocephalic and atraumatic. Mouth/Throat: No oropharyngeal exudate. Eyes: Conjunctivae and EOM are normal.   Neck: Normal range of motion. Neck supple. Cardiovascular: Normal rate, regular rhythm and normal heart sounds. No murmur heard. Pulmonary/Chest: Effort normal and breath sounds normal. No respiratory distress. Abdominal: Soft. Bowel sounds are normal. He exhibits no distension. There is no rebound. Musculoskeletal: He exhibits no edema or tenderness. Neurological: He is alert and oriented to person, place, and time. Skin: Skin is warm. No erythema. Psychiatric: He has a normal mood and affect. His behavior is normal.   Nursing note and vitals reviewed.       ASSESSMENT and PLAN  Diagnoses and all orders for this visit:    1. CVA, old, speech/language deficit    2. HTN, goal below 130/80    3. Hypercholesterolemia    4. Tobacco abuse disorder    Other orders  -     nicotine 10 mg/mL spry; using one or two doses per hour. Each dose is two sprays, one in each nostril. You should not use more than five doses per hour or 40 doses per day (24 hours). -     rosuvastatin (CRESTOR) 20 mg tablet; TAKE 1 TABLET BY MOUTH EVERY NIGHT  -     losartan-hydroCHLOROthiazide (HYZAAR) 100-25 mg per tablet; Take 1 Tab by mouth daily. At this time patient was told to lose weight, so that his body mass index would get into a normal level between 20-25,  increase physical activity, limit alcohol consumption, stop secondhand tobacco exposure    In addition the patient was told to start an active life style modifications, for which includes creating a an interesting delightful to do list,  such as start of a light physical activity with a brisk daily walking 30 minutes most days of the week, most likely to total of 150 minutes per week, then the patient was told to try to avoid fatty fast foods, have a low-fat low-cholesterol diet, include seafood such as adding fatty fish such as [place], [place], [place] to the diet, increase vegetables and fruits, nuts 3-4 times per week and finally have a low-salt and K rich food intake for a good 4-6 months possibly for ever for the best outcome,   All mentioned recommendations, have to be done at least most days of the weeks for the best result,  Routine labs ordered, and the needed abnormal labs will be discussed soon and they can be repeated in 3-6 months. In addition relevant handouts were given to the patient for a better understanding,    patient was told to call if any problems. Patient acknowledged understanding and     Patient agreed with today's recommendation    Discussed the patient's BMI with him.   The BMI follow up plan is as follows:     dietary management education, guidance, and counseling  encourage exercise  monitor weight  prescribed dietary intake    An After Visit Summary was printed and given to the patient.

## 2017-12-28 NOTE — PROGRESS NOTES
Chief Complaint   Patient presents with    Hypertension       1. Have you been to the ER, urgent care clinic since your last visit? Hospitalized since your last visit? no  2. Have you seen or consulted any other health care providers outside of the [address] [place] [address] since your last visit? Include any pap smears or colon screening.  no

## 2017-12-28 NOTE — PATIENT INSTRUCTIONS
Body Mass Index: Care Instructions  Your Care Instructions    Body mass index (BMI) can help you see if your weight is raising your risk for health problems. It uses a formula to compare how much you weigh with how tall you are. · A BMI lower than 18.5 is considered underweight. · A BMI between 18.5 and 24.9 is considered healthy. · A BMI between 25 and 29.9 is considered overweight. A BMI of 30 or higher is considered obese. If your BMI is in the normal range, it means that you have a lower risk for weight-related health problems. If your BMI is in the overweight or obese range, you may be at increased risk for weight-related health problems, such as high blood pressure, heart disease, stroke, arthritis or joint pain, and diabetes. If your BMI is in the underweight range, you may be at increased risk for health problems such as fatigue, lower protection (immunity) against illness, muscle loss, bone loss, hair loss, and hormone problems. BMI is just one measure of your risk for weight-related health problems. You may be at higher risk for health problems if you are not active, you eat an unhealthy diet, or you drink too much alcohol or use tobacco products. Follow-up care is a key part of your treatment and safety. Be sure to make and go to all appointments, and call your doctor if you are having problems. It's also a good idea to know your test results and keep a list of the medicines you take. How can you care for yourself at home? · Practice healthy eating habits. This includes eating plenty of fruits, vegetables, whole grains, lean protein, and low-fat dairy. · If your doctor recommends it, get more exercise. Walking is a good choice. Bit by bit, increase the amount you walk every day. Try for at least 30 minutes on most days of the week. · Do not smoke. Smoking can increase your risk for health problems. If you need help quitting, talk to your doctor about stop-smoking programs and medicines. These can increase your chances of quitting for good. · Limit alcohol to 2 drinks a day for men and 1 drink a day for women. Too much alcohol can cause health problems. If you have a BMI higher than 25  · Your doctor may do other tests to check your risk for weight-related health problems. This may include measuring the distance around your waist. A waist measurement of more than 40 inches in men or 35 inches in women can increase the risk of weight-related health problems. · Talk with your doctor about steps you can take to stay healthy or improve your health. You may need to make lifestyle changes to lose weight and stay healthy, such as changing your diet and getting regular exercise. If you have a BMI lower than 18.5  · Your doctor may do other tests to check your risk for health problems. · Talk with your doctor about steps you can take to stay healthy or improve your health. You may need to make lifestyle changes to gain or maintain weight and stay healthy, such as getting more healthy foods in your diet and doing exercises to build muscle. Where can you learn more? Go to http://nereyda-micki.info/. Enter S176 in the search box to learn more about \"Body Mass Index: Care Instructions. \"  Current as of: October 13, 2016  Content Version: 11.4  © 4575-1436 [employer]wise, Inc[employer]. Care instructions adapted under license by [employer] (which disclaims liability or warranty for this information). If you have questions about a medical condition or this instruction, always ask your healthcare professional. [address] any warranty or liability for your use of this information.

## 2017-12-28 NOTE — MR AVS SNAPSHOT
Visit Information Date & Time Provider Department Dept. Phone Encounter #  
 12/28/2017 11:30 AM Marcello Lubin MD [facility] [phone] [id number] Follow-up Instructions Return in about 4 weeks (around 1/25/2018), or if symptoms worsen or fail to improve. Upcoming Health Maintenance Date Due ZOSTER VACCINE AGE 60> 10/31/2017 FOBT Q 1 YEAR AGE 50-75 5/1/2018 DTaP/Tdap/Td series (2 - Td) 5/1/2027 Allergies as of 12/28/2017  Review Complete On: 12/28/2017 By: Gurmeet Muñiz LPN No Known Allergies Current Immunizations  Reviewed on 4/12/2017 Name Date Influenza Vaccine Split  Deferred (Patient Refused) ZZZ-RETIRED (DO NOT USE) Pneumococcal Vaccine (Unspecified Type)  Deferred (Patient Refused) Not reviewed this visit You Were Diagnosed With   
  
 Codes Comments CVA, old, speech/language deficit    -  Primary ICD-10-CM: U21.916 ICD-9-CM: 438.10 HTN, goal below 130/80     ICD-10-CM: I10 
ICD-9-CM: 401.9 Hypercholesterolemia     ICD-10-CM: E78.00 ICD-9-CM: 272.0 Tobacco abuse disorder     ICD-10-CM: Z72.0 ICD-9-CM: 305.1 Vitals BP Pulse Temp Resp Height(growth percentile) Weight(growth percentile) (!) 153/95 (!) 56 96.5 °F (35.8 °C) (Oral) 16 5' 8\" (1.727 m) 178 lb (80.7 kg) SpO2 BMI Smoking Status 99% 27.06 kg/m2 Current Every Day Smoker Vitals History BMI and BSA Data Body Mass Index Body Surface Area  
 27.06 kg/m 2 1.97 m 2 Preferred Pharmacy Pharmacy Name Phone [address] [facility] [address] [facility] [phone] Your Updated Medication List  
  
   
This list is accurate as of: 12/28/17  1:02 PM.  Always use your most recent med list. amLODIPine 5 mg tablet Commonly known as:  Stephan Lennon Take 1 Tab by mouth daily. Indications: hypertension aspirin delayed-release 81 mg tablet Take 81 mg by mouth daily. buPROPion 100 mg tablet Commonly known as:  [facility] Take 1 Tab by mouth daily. Indications: Smoking Cessation  
  
 losartan-hydroCHLOROthiazide 100-25 mg per tablet Commonly known as:  HYZAAR Take 1 Tab by mouth daily. nicotine 10 mg/mL Spry  
using one or two doses per hour. Each dose is two sprays, one in each nostril. You should not use more than five doses per hour or 40 doses per day (24 hours). rosuvastatin 20 mg tablet Commonly known as:  CRESTOR  
TAKE 1 TABLET BY MOUTH EVERY NIGHT Prescriptions Printed Refills  
 nicotine 10 mg/mL spry 3 Sig: using one or two doses per hour. Each dose is two sprays, one in each nostril. You should not use more than five doses per hour or 40 doses per day (24 hours). Class: Print Prescriptions Sent to Pharmacy Refills buPROPion (WELLBUTRIN) 100 mg tablet 3 Sig: Take 1 Tab by mouth daily. Indications: Smoking Cessation Class: Normal  
 Pharmacy: [facility] AT [address] [place] [address] Ph #: [phone] Route: Oral  
 rosuvastatin (CRESTOR) 20 mg tablet 1 Sig: TAKE 1 TABLET BY MOUTH EVERY NIGHT Class: Normal  
 Pharmacy: [facility] [address] [facility] [address] AT [address] [place] [address] Ph #: [phone]  
 amLODIPine (NORVASC) 5 mg tablet 2 Sig: Take 1 Tab by mouth daily. Indications: hypertension Class: Normal  
 Pharmacy: [facility] AT [address] [place] [address] Ph #: [phone] Route: Oral  
  
Follow-up Instructions Return in about 4 weeks (around 1/25/2018), or if symptoms worsen or fail to improve. Patient Instructions Body Mass Index: Care Instructions Your Care Instructions Body mass index (BMI) can help you see if your weight is raising your risk for health problems. It uses a formula to compare how much you weigh with how tall you are. · A BMI lower than 18.5 is considered underweight. · A BMI between 18.5 and 24.9 is considered healthy. · A BMI between 25 and 29.9 is considered overweight. A BMI of 30 or higher is considered obese. If your BMI is in the normal range, it means that you have a lower risk for weight-related health problems. If your BMI is in the overweight or obese range, you may be at increased risk for weight-related health problems, such as high blood pressure, heart disease, stroke, arthritis or joint pain, and diabetes. If your BMI is in the underweight range, you may be at increased risk for health problems such as fatigue, lower protection (immunity) against illness, muscle loss, bone loss, hair loss, and hormone problems. BMI is just one measure of your risk for weight-related health problems. You may be at higher risk for health problems if you are not active, you eat an unhealthy diet, or you drink too much alcohol or use tobacco products. Follow-up care is a key part of your treatment and safety. Be sure to make and go to all appointments, and call your doctor if you are having problems. It's also a good idea to know your test results and keep a list of the medicines you take. How can you care for yourself at home? · Practice healthy eating habits. This includes eating plenty of fruits, vegetables, whole grains, lean protein, and low-fat dairy. · If your doctor recommends it, get more exercise. Walking is a good choice. Bit by bit, increase the amount you walk every day. Try for at least 30 minutes on most days of the week. · Do not smoke. Smoking can increase your risk for health problems. If you need help quitting, talk to your doctor about stop-smoking programs and medicines. These can increase your chances of quitting for good. · Limit alcohol to 2 drinks a day for men and 1 drink a day for women. Too much alcohol can cause health problems. If you have a BMI higher than 25 · Your doctor may do other tests to check your risk for weight-related health problems. This may include measuring the distance around your waist. A waist measurement of more than 40 inches in men or 35 inches in women can increase the risk of weight-related health problems. · Talk with your doctor about steps you can take to stay healthy or improve your health. You may need to make lifestyle changes to lose weight and stay healthy, such as changing your diet and getting regular exercise. If you have a BMI lower than 18.5 · Your doctor may do other tests to check your risk for health problems. · Talk with your doctor about steps you can take to stay healthy or improve your health. You may need to make lifestyle changes to gain or maintain weight and stay healthy, such as getting more healthy foods in your diet and doing exercises to build muscle. Where can you learn more? Go to http://nereyda-micki.info/. Enter S176 in the search box to learn more about \"Body Mass Index: Care Instructions. \" Current as of: October 13, 2016 Content Version: 11.4 © 2460-6349 [employer]. Care instructions adapted under license by [employer] (which disclaims liability or warranty for this information). If you have questions about a medical condition or this instruction, always ask your healthcare professional. [place] [address] any warranty or liability for your use of this information. Introducing [facility]! [facility] introduces [employer] patient portal. Now you can access parts of your medical record, email your doctor's office, and request medication refills online. 1. In your internet browser, go to [email or web address]. [employer] 2. Click on the First Time User? Click Here link in the Sign In box. You will see the New Member Sign Up page. 3. Enter your [employer] Access Code exactly as it appears below. You will not need to use this code after youve completed the sign-up process. If you do not sign up before the expiration date, you must request a new code. · [employer] Access Code: [email or web address] Expires: 3/28/2018  1:02 PM 
 
4. Enter the last four digits of your Social Security Number (xxxx) and Date of Birth (mm/dd/yyyy) as indicated and click Submit. You will be taken to the next sign-up page. 5. Create a [employer] ID. This will be your [employer] login ID and cannot be changed, so think of one that is secure and easy to remember. 6. Create a [employer] password. You can change your password at any time. 7. Enter your Password Reset Question and Answer. This can be used at a later time if you forget your password. 8. Enter your e-mail address. You will receive e-mail notification when new information is available in [address] 9. Click Sign Up. You can now view and download portions of your medical record. 10. Click the Download Summary menu link to download a portable copy of your medical information. If you have questions, please visit the Frequently Asked Questions section of the [employer] website. Remember, [employer] is NOT to be used for urgent needs. For medical emergencies, dial 911. Now available from your iPhone and Android! Please provide this summary of care documentation to your next provider. Your primary care clinician is listed as Jackson Kirkpatrick. If you have any questions after today's visit, please call [phone].

## 2018-01-25 ENCOUNTER — OFFICE VISIT (OUTPATIENT)
Dept: FAMILY MEDICINE CLINIC | Age: 63
End: 2018-01-25

## 2018-01-25 VITALS
OXYGEN SATURATION: 98 % | WEIGHT: 178.4 LBS | HEART RATE: 60 BPM | BODY MASS INDEX: 27.04 KG/M2 | HEIGHT: 68 IN | TEMPERATURE: 96.9 F | DIASTOLIC BLOOD PRESSURE: 78 MMHG | RESPIRATION RATE: 14 BRPM | SYSTOLIC BLOOD PRESSURE: 155 MMHG

## 2018-01-25 DIAGNOSIS — I69.328 CVA, OLD, SPEECH/LANGUAGE DEFICIT: Primary | ICD-10-CM

## 2018-01-25 DIAGNOSIS — I10 HTN, GOAL BELOW 130/80: ICD-10-CM

## 2018-01-25 DIAGNOSIS — Z23 ENCOUNTER FOR IMMUNIZATION: ICD-10-CM

## 2018-01-25 DIAGNOSIS — Z72.0 TOBACCO ABUSE DISORDER: ICD-10-CM

## 2018-01-25 RX ORDER — AMLODIPINE BESYLATE 10 MG/1
TABLET ORAL
Qty: 90 TAB | Refills: 1 | Status: SHIPPED | OUTPATIENT
Start: 2018-01-25 | End: 2018-09-11 | Stop reason: SDUPTHER

## 2018-01-25 RX ORDER — ROSUVASTATIN CALCIUM 10 MG/1
TABLET, COATED ORAL
COMMUNITY
Start: 2017-11-02 | End: 2018-01-25 | Stop reason: SDUPTHER

## 2018-01-25 RX ORDER — VARENICLINE TARTRATE 25 MG
KIT ORAL
Qty: 1 DOSE PACK | Refills: 0 | Status: SHIPPED | OUTPATIENT
Start: 2018-01-25 | End: 2018-07-25

## 2018-01-25 RX ORDER — VARENICLINE TARTRATE 1 MG/1
TABLET, FILM COATED ORAL
Qty: 30 TAB | Refills: 1 | Status: SHIPPED | OUTPATIENT
Start: 2018-02-25 | End: 2018-07-25

## 2018-01-25 NOTE — MR AVS SNAPSHOT
[address] [facility] [place] [place] [facility] [id number] 
[phone] Patient: Champ Novoa MRN: [id number] OQN:[id number] Visit Information Date & Time Provider Department Dept. Phone Encounter #  
 1/25/2018 12:30 PM Ivan Baum MD [address] [place] [address] OFFICE-ANNEX [phone] [id number] Follow-up Instructions Return in about 6 months (around 7/25/2018), or if symptoms worsen or fail to improve. Follow-up and Disposition History Upcoming Health Maintenance Date Due ZOSTER VACCINE AGE 60> 10/31/2017 FOBT Q 1 YEAR AGE 50-75 5/1/2018 DTaP/Tdap/Td series (2 - Td) 5/1/2027 Allergies as of 1/25/2018  Review Complete On: 1/25/2018 By: Mary Raymond LPN No Known Allergies Current Immunizations  Reviewed on 4/12/2017 Name Date Influenza Vaccine Split  Deferred (Patient Refused) ZZZ-RETIRED (DO NOT USE) Pneumococcal Vaccine (Unspecified Type)  Deferred (Patient Refused) Not reviewed this visit You Were Diagnosed With   
  
 Codes Comments CVA, old, speech/language deficit    -  Primary ICD-10-CM: U12.799 ICD-9-CM: 438.10 HTN, goal below 130/80     ICD-10-CM: I10 
ICD-9-CM: 401.9 Tobacco abuse disorder     ICD-10-CM: Z72.0 ICD-9-CM: 305.1 Encounter for immunization     ICD-10-CM: O87 ICD-9-CM: V03.89 Vitals BP Pulse Temp Resp Height(growth percentile) Weight(growth percentile) 155/78 60 96.9 °F (36.1 °C) (Oral) 14 5' 8\" (1.727 m) 178 lb 6.4 oz (80.9 kg) SpO2 BMI Smoking Status 98% 27.13 kg/m2 Current Every Day Smoker Vitals History BMI and BSA Data Body Mass Index Body Surface Area  
 27.13 kg/m 2 1.97 m 2 [facility] [address], [address] [facility] [phone] Your Updated Medication List  
  
   
 This list is accurate as of: 18  1:57 PM.  Always use your most recent med list. amLODIPine 10 mg tablet Commonly known as:  Nasrin Slate TAKE 1 TABLET BY MOUTH DAILY  Indications: hypertension  
  
 aspirin delayed-release 81 mg tablet Take 81 mg by mouth daily. losartan-hydroCHLOROthiazide 100-25 mg per tablet Commonly known as:  HYZAAR Take 1 Tab by mouth daily. rosuvastatin 20 mg tablet Commonly known as:  CRESTOR  
TAKE 1 TABLET BY MOUTH EVERY NIGHT * varenicline 0.5 mg (11)- 1 mg (42) Dspk Commonly known as:  CHANTIX STARTER АНДРЕЙ One package as directed for one months * varenicline 1 mg tablet Commonly known as:  [address] As directed one package Start taking on:  2018  
  
 varicella zoster vaccine live 19,400 unit/0.65 mL Susr injection Commonly known as:  varicella-zoster vacine live 1 Vial by SubCUTAneous route once for 1 dose. * Notice: This list has 2 medication(s) that are the same as other medications prescribed for you. Read the directions carefully, and ask your doctor or other care provider to review them with you. Prescriptions Printed Refills  
 varenicline (CHANTIX STARTER АНДРЕЙ) 0.5 mg (11)- 1 mg (42) DsPk 0 Sig: One package as directed for one months Class: Print  
 varenicline (CHANTIX CONTINUING MONTH BOX) 1 mg tablet 1 Starting on: 2018 Sig: As directed one package Class: Print  
 varicella zoster vaccine live (VARICELLA-ZOSTER VACINE LIVE) 19,400 unit/0.65 mL susr injection 0 Si Vial by SubCUTAneous route once for 1 dose. Class: Print Route: SubCUTAneous  
 amLODIPine (NORVASC) 10 mg tablet 1 Sig: TAKE 1 TABLET BY MOUTH DAILY  Indications: hypertension Class: Print Follow-up Instructions Return in about 6 months (around 2018), or if symptoms worsen or fail to improve. Patient Instructions Learning About Benefits From Quitting Smoking How does quitting smoking make you healthier? If you're thinking about quitting smoking, you may have a few reasons to be smoke-free. Your health may be one of them. · When you quit smoking, you lower your risks for cancer, lung disease, heart attack, stroke, blood vessel disease, and blindness from macular degeneration. · When you're smoke-free, you get sick less often, and you heal faster. You are less likely to get colds, flu, bronchitis, and pneumonia. · As a nonsmoker, you may find that your mood is better and you are less stressed. When and how will you feel healthier? Quitting has real health benefits that start from day 1 of being smoke-free. And the longer you stay smoke-free, the healthier you get and the better you feel. The first hours · After just 20 minutes, your blood pressure and heart rate go down. That means there's less stress on your heart and blood vessels. · Within 12 hours, the level of carbon monoxide in your blood drops back to normal. That makes room for more oxygen. With more oxygen in your body, you may notice that you have more energy than when you smoked. After 2 weeks · Your lungs start to work better. · Your risk of heart attack starts to drop. After 1 month · When your lungs are clear, you cough less and breathe deeper, so it's easier to be active. · Your sense of taste and smell return. That means you can enjoy food more than you have since you started smoking. Over the years · After 1 year, your risk of heart disease is half what it would be if you kept smoking. · After 5 years, your risk of stroke starts to shrink. Within a few years after that, it's about the same as if you'd never smoked. · After 10 years, your risk of dying from lung cancer is cut by about half. And your risk for many other types of cancer is lower too. How would quitting help others in your life? When you quit smoking, you improve the health of everyone who now breathes in your smoke. · Their heart, lung, and cancer risks drop, much like yours. · They are sick less. For babies and small children, living smoke-free means they're less likely to have ear infections, pneumonia, and bronchitis. · If you're a woman who is or will be pregnant someday, quitting smoking means a healthier . · Children who are close to you are less likely to become adult smokers. Where can you learn more? Go to http://nereyda-micki.info/. Enter [phone] [id number] [phone] in the search box to learn more about \"Learning About Benefits From Quitting Smoking. \" Current as of: 2017 Content Version: 11.4 © 3218-3484 Healthwise, [employer]. Care instructions adapted under license by [employer] (which disclaims liability or warranty for this information). If you have questions about a medical condition or this instruction, always ask your healthcare professional. [place] [address] any warranty or liability for your use of this information. Introducing [facility]! [employer] introduces Fair value patient portal. Now you can access parts of your medical record, email your doctor's office, and request medication refills online. 1. In your internet browser, go to [email or web address] 2. Click on the First Time User? Click Here link in the Sign In box. You will see the New Member Sign Up page. 3. Enter your Fair value Access Code exactly as it appears below. You will not need to use this code after youve completed the sign-up process. If you do not sign up before the expiration date, you must request a new code. · Fair value Access Code: [email or web address] Expires: 3/28/2018  1:02 PM 
 
4. Enter the last four digits of your Social Security Number (xxxx) and Date of Birth (mm/dd/yyyy) as indicated and click Submit. You will be taken to the next sign-up page. 5. Create a I AM AT ID. This will be your I AM AT login ID and cannot be changed, so think of one that is secure and easy to remember. 6. Create a I AM AT password. You can change your password at any time. 7. Enter your Password Reset Question and Answer. This can be used at a later time if you forget your password. 8. Enter your e-mail address. You will receive e-mail notification when new information is available in [address] 9. Click Sign Up. You can now view and download portions of your medical record. 10. Click the Download Summary menu link to download a portable copy of your medical information. If you have questions, please visit the Frequently Asked Questions section of the I AM AT website. Remember, I AM AT is NOT to be used for urgent needs. For medical emergencies, dial 911. Now available from your iPhone and Android! Please provide this summary of care documentation to your next provider. Your primary care clinician is listed as Gregory Shaffer. If you have any questions after today's visit, please call [phone].

## 2018-01-25 NOTE — PROGRESS NOTES
Name and  verified          Chief Complaint   Patient presents with   Porfirio Elena Results         Health Maintenance reviewed-discussed with patient. 1. Have you been to the ER, urgent care clinic since your last visit? Hospitalized since your last visit? No    2. Have you seen or consulted any other health care providers outside of the [address] [place] [address] since your last visit? Include any pap smears or colon screening.  No

## 2018-01-25 NOTE — PROGRESS NOTES
HISTORY OF PRESENT ILLNESS  Governmartina Brar is a 63 y.o. male. HPI       HTN  Today pt present for Bp check and the patient stating that so far there has been a Compliancy w/ the bp meds, last visit pt was given ccb bp still elevated on the repeat after good period of resting, he is having had the low salt diet   the patient has been active, was given meds and nicotine nose spray not able to get it covered, pt is in contemplating stage for his tobacco abuse,  pt states that patien does not obtain the bp at home, 2ppd/m  , today the pt denies Chest Pain, has no legs swelling no lightheadedness,  the pat has not been feeling anxious, and  Has not been feeling stressed out,   otherwise feeling better since the last visit      Current Outpatient Prescriptions   Medication Sig Dispense Refill    rosuvastatin (CRESTOR) 20 mg tablet TAKE 1 TABLET BY MOUTH EVERY NIGHT 90 Tab 1    nicotine 10 mg/mL spry using one or two doses per hour. Each dose is two sprays, one in each nostril. You should not use more than five doses per hour or 40 doses per day (24 hours). 1 Bottle 3    losartan-hydroCHLOROthiazide (HYZAAR) 100-25 mg per tablet Take 1 Tab by mouth daily. 90 Tab 2    buPROPion (WELLBUTRIN) 100 mg tablet TAKE 1 TABLET BY MOUTH DAILY FOR SMOKING CESSATION 90 Tab 3    amLODIPine (NORVASC) 5 mg tablet TAKE 1 TABLET BY MOUTH DAILY FOR HIGH BLOOD PRESSURE 90 Tab 2    aspirin delayed-release 81 mg tablet Take 81 mg by mouth daily.        No Known Allergies  Past Medical History:   Diagnosis Date    Acute MI, inferoposterior wall, initial episode of care ([facility] [address] 4/22/2011    Arthritis     CAD (coronary artery disease) 4/23/2011    CAD (coronary artery disease), native coronary artery 4/22/2011    Chronic pain     CVA, old, speech/language deficit 4/12/2017    HTN, goal below 130/80 4/12/2017    Hypercholesterolemia 4/12/2017    Hypertension 4/22/2011    St[facility] ([facility]     Tobacco abuse disorder 4/12/2017    Unstable angina ([facility] [address] 4/22/2011     History reviewed. No pertinent surgical history. Family History   Problem Relation Age of Onset    Dementia Mother     Emphysema Father     Hypertension Father      Social History   Substance Use Topics    Smoking status: Current Every Day Smoker     Packs/day: 1.00     Years: 35.00    Smokeless tobacco: Never Used      Comment: 2 packs of cigs last 1 month    Alcohol use No      Lab Results  Component Value Date/Time   WBC 6.6 04/12/2017 10:49 AM   HGB 10.9 04/12/2017 10:49 AM   HCT 35.7 04/12/2017 10:49 AM   PLATELET 028 51/97/7373 10:49 AM   MCV 82 04/12/2017 10:49 AM     Lab Results  Component Value Date/Time   Glucose 94 04/12/2017 10:49 AM   LDL, calculated 76 04/12/2017 10:49 AM   Creatinine 0.80 04/12/2017 10:49 AM      Lab Results  Component Value Date/Time   Cholesterol, total 131 04/12/2017 10:49 AM   HDL Cholesterol 35 04/12/2017 10:49 AM   LDL, calculated 76 04/12/2017 10:49 AM   Triglyceride 100 04/12/2017 10:49 AM     Lab Results  Component Value Date/Time   GFR est non-AA 98 04/12/2017 10:49 AM   GFR est  04/12/2017 10:49 AM   Creatinine 0.80 04/12/2017 10:49 AM   BUN 10 04/12/2017 10:49 AM   Sodium 140 04/12/2017 10:49 AM   Potassium 4.5 04/12/2017 10:49 AM   Chloride 102 04/12/2017 10:49 AM   CO2 25 04/12/2017 10:49 AM        Review of Systems   Constitutional: Negative for chills and fever. HENT: Negative for ear pain and nosebleeds. Eyes: Negative for blurred vision, pain and discharge. Respiratory: Negative for shortness of breath. Cardiovascular: Negative for chest pain and leg swelling. Gastrointestinal: Negative for constipation, diarrhea, nausea and vomiting. Genitourinary: Negative for frequency. Musculoskeletal: Negative for joint pain. Skin: Negative for itching and rash. Neurological: Negative for headaches. Psychiatric/Behavioral: Negative for depression. The patient is not nervous/anxious.         Physical Exam Constitutional: He is oriented to person, place, and time. He appears well-developed and well-nourished. HENT:   Head: Normocephalic and atraumatic. Mouth/Throat: No oropharyngeal exudate. Eyes: Conjunctivae and EOM are normal.   Neck: Normal range of motion. Neck supple. Cardiovascular: Normal rate, regular rhythm and normal heart sounds. No murmur heard. Pulmonary/Chest: Effort normal and breath sounds normal. No respiratory distress. Abdominal: Soft. Bowel sounds are normal. He exhibits no distension. There is no rebound. Musculoskeletal: He exhibits no edema or tenderness. Neurological: He is alert and oriented to person, place, and time. Skin: Skin is warm. No erythema. Psychiatric: He has a normal mood and affect. His behavior is normal.   Nursing note and vitals reviewed. ASSESSMENT and PLAN  Diagnoses and all orders for this visit:    1. CVA, old, speech/language deficit    2. HTN, goal below 130/80    3. Tobacco abuse disorder    4. Encounter for immunization    Other orders  -     varenicline (CHANTIX STARTER АНДРЕЙ) 0.5 mg (11)- 1 mg (42) DsPk; One package as directed for one months  -     varenicline (CHANTIX CONTINUING MONTH BOX) 1 mg tablet; As directed one package  -     varicella zoster vaccine live (VARICELLA-ZOSTER VACINE LIVE) 19,400 unit/0.65 mL susr injection; 1 Vial by SubCUTAneous route once for 1 dose.   -     amLODIPine (NORVASC) 10 mg tablet; TAKE 1 TABLET BY MOUTH DAILY  Indications: hypertension      Today, patient was told to cont with active life style modifications, for which includes creating a an interesting delightful to do list,  such as start of a light physical activity with a brisk daily walking 30 minutes most days of the week, most likely to total of 150 minutes per week, then the patient was told to try to avoid fatty fast foods, have a low-fat low-cholesterol diet, include seafood such as adding fatty fish such as [place], [place], [place] to the diet, increase vegetables and fruits, nuts 3-4 times per week and finally have a low-salt and K rich food intake for a good 4-6 months possibly for ever for the best outcome,     patient was told to call if any problems.   Patient acknowledged understanding and     Patient agreed with today's recommendation

## 2018-01-25 NOTE — PATIENT INSTRUCTIONS

## 2018-07-25 ENCOUNTER — OFFICE VISIT (OUTPATIENT)
Dept: FAMILY MEDICINE CLINIC | Age: 63
End: 2018-07-25

## 2018-07-25 VITALS
HEART RATE: 62 BPM | TEMPERATURE: 96 F | WEIGHT: 169.2 LBS | HEIGHT: 68 IN | DIASTOLIC BLOOD PRESSURE: 76 MMHG | SYSTOLIC BLOOD PRESSURE: 133 MMHG | BODY MASS INDEX: 25.64 KG/M2 | RESPIRATION RATE: 16 BRPM | OXYGEN SATURATION: 98 %

## 2018-07-25 DIAGNOSIS — Z13.31 SCREENING FOR DEPRESSION: ICD-10-CM

## 2018-07-25 DIAGNOSIS — Z12.11 SCREEN FOR COLON CANCER: ICD-10-CM

## 2018-07-25 DIAGNOSIS — Z13.39 SCREENING FOR ALCOHOLISM: ICD-10-CM

## 2018-07-25 DIAGNOSIS — Z23 ENCOUNTER FOR IMMUNIZATION: Primary | ICD-10-CM

## 2018-07-25 NOTE — PROGRESS NOTES
Name and  verified      Chief Complaint   Patient presents with    Hypertension     f/u         Health Maintenance reviewed-discussed with patient. 1. Have you been to the ER, urgent care clinic since your last visit? Hospitalized since your last visit? No    2. Have you seen or consulted any other health care providers outside of the [address] [place] [address] since your last visit? Include any pap smears or colon screening.  No

## 2018-07-25 NOTE — PROGRESS NOTES
Discussed the patient's BMI with him. The BMI follow up plan is as follows:     dietary management education, guidance, and counseling  encourage exercise  monitor weight  prescribed dietary intake    An After Visit Summary was printed and given to the patient. This is an Initial Medicare Annual Wellness Exam (AWV) (Performed 12 months after IPPE or effective date of Medicare Part B enrollment, Once in a lifetime)    I have reviewed the patient's medical history in detail and updated the computerized patient record. History     Present for CPE, last Complete Physical exam was few yrs ago , not Up todate w/ all vaccination, last tetanus vaccine was in >5yrs ago, all offered but refused,  Last psa exam was normal and was in few yrs ago,         last colonoscopy was never secondary to the copay asked for >100$ not able to pay for it, No past surgical hx,  last bone dexa scan was never,   No family hx of breast cancer in male  no family hx of prostate cancer   no family hx of colon cancer, parent  of old age, ++sexaully active and uses Safe sex, + physically active,  compliant w/ meds, no Rf needed for today for his meds. Past Medical History:   Diagnosis Date    Acute MI, inferoposterior wall, initial episode of care ([facility] [address] 2011    Arthritis     CAD (coronary artery disease) 2011    CAD (coronary artery disease), native coronary artery 2011    Chronic pain     CVA, old, speech/language deficit 2017    Encounter for immunization 2018    HTN, goal below 130/80 2017    Hypercholesterolemia 2017    Hypertension 2011    Stroke ([facility] [address]     Tobacco abuse disorder 2017    Unstable angina ([facility] [address] 2011      History reviewed. No pertinent surgical history. Current Outpatient Prescriptions   Medication Sig Dispense Refill    aspirin delayed-release 81 mg tablet Take 81 mg by mouth daily.       amLODIPine (NORVASC) 10 mg tablet TAKE 1 TABLET BY MOUTH DAILY  Indications: hypertension 90 Tab 1    rosuvastatin (CRESTOR) 20 mg tablet TAKE 1 TABLET BY MOUTH EVERY NIGHT 90 Tab 1    losartan-hydroCHLOROthiazide (HYZAAR) 100-25 mg per tablet Take 1 Tab by mouth daily. 80 Tab 2     No Known Allergies  Family History   Problem Relation Age of Onset    Dementia Mother     Emphysema Father     Hypertension Father      Social History   Substance Use Topics    Smoking status: Current Every Day Smoker     Packs/day: 1.00     Years: 35.00    Smokeless tobacco: Never Used      Comment: 2 packs of cigs last 1 month    Alcohol use No     Patient Active Problem List   Diagnosis Code    CVA, old, speech/language deficit I69.328    HTN, goal below 130/80 I10    Hypercholesterolemia E78.00    Tobacco abuse disorder Z72.0    Screening PSA (prostate specific antigen) Z12.5    Encounter for immunization Z23       Depression Risk Factor Screening:     PHQ over the last two weeks 7/25/2018   Little interest or pleasure in doing things Not at all   Feeling down, depressed, irritable, or hopeless Not at all   Total Score PHQ 2 0     Alcohol Risk Factor Screening: You do not drink alcohol or very rarely. Functional Ability and Level of Safety:     Hearing Loss  Hearing is good. Activities of Daily Living  The home contains: handrails, grab bars and rugs  Patient does total self care    Fall Risk  No flowsheet data found.     Abuse Screen  Patient is not abused    Cognitive Screening   Evaluation of Cognitive Function:  Has your family/caregiver stated any concerns about your memory: no  Normal    Patient Care Team   Patient Care Team:  Stiven Zeng MD as PCP - General (Family Practice)    Assessment/Plan   Education and counseling provided:  Are appropriate based on today's review and evaluation  End-of-Life planning (with patient's consent)  Pneumococcal Vaccine  Influenza Vaccine  Colorectal cancer screening tests    Diagnoses and all orders for this visit: 1. Encounter for immunization  -     pneumococcal 13 molly conj dip (PREVNAR-13) 0.5 mL syrg injection; 0.5 mL by IntraMUSCular route once for 1 dose. -     TETANUS, DIPHTHERIA TOXOIDS AND ACELLULAR PERTUSSIS VACCINE (TDAP), IN INDIVIDS. >=7, IM    2. Screening for alcoholism  -     Annual  Alcohol Screen 15 min ()    3. Screening for depression  -     Depression Screen Annual    4. Screen for colon cancer  -     OCCULT BLOOD, IMMUNOASSAY (FIT) ([id number])         [facility] education and counseling provided:  Age appropriate evidence-based preventive care recommendations based on today's review and evaluation; including relevant cancer screening guidelines, and vaccination recommendations. An After Visit Summary was printed and given to the patient which information about these guidelines, and a personalized schedule for health maintenance items. Whe appropriate and with patient agreement, orders noted below were placed to complete missing health maintenance items.       Health Maintenance Due   Topic Date Due    ZOSTER VACCINE AGE 60>  10/31/2017    MEDICARE YEARLY EXAM  03/14/2018    FOBT Q 1 YEAR AGE 50-75  05/01/2018

## 2018-07-25 NOTE — MR AVS SNAPSHOT
[address] [facility] [place] [facility] [address] 
[phone] Patient: Mayela Anderson MRN: [id number] HOP:75/12/9265 Visit Information Date & Time Provider Department Dept. Phone Encounter #  
 7/25/2018  2:30 PM Shukri Gilebrt MD [facility] [phone] [id number] Follow-up Instructions Return in about 4 months (around 11/25/2018), or if symptoms worsen or fail to improve. Follow-up and Disposition History Upcoming Health Maintenance Date Due ZOSTER VACCINE AGE 60> 10/31/2017 MEDICARE YEARLY EXAM 3/14/2018 FOBT Q 1 YEAR AGE 50-75 5/1/2018 Influenza Age 5 to Adult 8/1/2018 DTaP/Tdap/Td series (2 - Td) 5/1/2027 Allergies as of 7/25/2018  Review Complete On: 7/25/2018 By: Shukri Gilbert MD  
 No Known Allergies Current Immunizations  Reviewed on 7/25/2018 Name Date Influenza Vaccine Split  Deferred (Patient Refused) Tdap  Deferred (Patient Refused) ZZZ-RETIRED (DO NOT USE) Pneumococcal Vaccine (Unspecified Type)  Deferred (Patient Refused) Reviewed by Shukri Gilbert MD on 7/25/2018 at  3:05 PM  
 Reviewed by Shukri Gilbert MD on 7/25/2018 at  3:05 PM  
 Reviewed by Shukri Gilbert MD on 7/25/2018 at  3:05 PM  
You Were Diagnosed With   
  
 Codes Comments Encounter for immunization    -  Primary ICD-10-CM: T29 ICD-9-CM: V03.89 Screening for alcoholism     ICD-10-CM: Z13.89 ICD-9-CM: V79.1 Screening for depression     ICD-10-CM: Z13.89 ICD-9-CM: V79.0 Screen for colon cancer     ICD-10-CM: Z12.11 ICD-9-CM: V76.51 Vitals BP Pulse Temp Resp Height(growth percentile) Weight(growth percentile) 133/76 (BP 1 Location: Left arm, BP Patient Position: At rest) 62 96 °F (35.6 °C) (Oral) 16 5' 8\" (1.727 m) 169 lb 3.2 oz (76.7 kg) SpO2 BMI Smoking Status 98% 25.73 kg/m2 Current Every Day Smoker Vitals History BMI and BSA Data Body Mass Index Body Surface Area 25.73 kg/m 2 1.92 m 2 Preferred Pharmacy Pharmacy Name Phone [facility] [address], [address] [facility] [phone] Your Updated Medication List  
  
   
This list is accurate as of 7/25/18  3:06 PM.  Always use your most recent med list. amLODIPine 10 mg tablet Commonly known as:  Yolanda Silvia TAKE 1 TABLET BY MOUTH DAILY  Indications: hypertension  
  
 aspirin delayed-release 81 mg tablet Take 81 mg by mouth daily. losartan-hydroCHLOROthiazide 100-25 mg per tablet Commonly known as:  HYZAAR Take 1 Tab by mouth daily. pneumococcal 13 molly conj dip 0.5 mL Syrg injection Commonly known as:  PREVNAR-13  
0.5 mL by IntraMUSCular route once for 1 dose. rosuvastatin 20 mg tablet Commonly known as:  CRESTOR  
TAKE 1 TABLET BY MOUTH EVERY NIGHT We Performed the Following Lorenzo 70 [[id number] [facility]] OCCULT BLOOD, IMMUNOASSAY (FIT) [id number] CPT(R)] [place] ANNUAL ALCOHOL SCREEN 15 MIN [id number] [facility]] TETANUS, DIPHTHERIA TOXOIDS AND ACELLULAR PERTUSSIS VACCINE (TDAP), IN INDIVIDS. >=7, IM [id number] CPT(R)] Follow-up Instructions Return in about 4 months (around 11/25/2018), or if symptoms worsen or fail to improve. Patient Instructions Learning About High Blood Pressure What is high blood pressure? Blood pressure is a measure of how hard the blood pushes against the walls of your arteries. It's normal for blood pressure to go up and down throughout the day, but if it stays up, you have high blood pressure. Another name for high blood pressure is hypertension. Two numbers tell you your blood pressure. The first number is the systolic pressure. It shows how hard the blood pushes when your heart is pumping. The second number is the diastolic pressure.  It shows how hard the blood pushes between heartbeats, when your heart is relaxed and filling with blood. A blood pressure of less than 120/80 (say \"120 over 80\") is ideal for an adult. High blood pressure is 130/80 or higher. You have high blood pressure if your top number is 130 or higher or your bottom number is 80 or higher, or both. What happens when you have high blood pressure? · Blood flows through your arteries with too much force. Over time, this damages the walls of your arteries. But you can't feel it. High blood pressure usually doesn't cause symptoms. · Fat and calcium start to build up in your arteries. This buildup is called plaque. Plaque makes your arteries narrower and stiffer. Blood can't flow through them as easily. · This lack of good blood flow starts to damage some of the organs in your body. This can lead to problems such as coronary artery disease and heart attack, heart failure, stroke, kidney failure, and eye damage. How can you prevent high blood pressure? · Stay at a healthy weight. · Try to limit how much sodium you eat to less than 2,300 milligrams (mg) a day. If you limit your sodium to 1,500 mg a day, you can lower your blood pressure even more. ¨ Buy foods that are labeled \"unsalted,\" \"sodium-free,\" or \"low-sodium. \" Foods labeled \"reduced-sodium\" and \"light sodium\" may still have too much sodium. ¨ Flavor your food with garlic, lemon juice, onion, vinegar, herbs, and spices instead of salt. Do not use soy sauce, steak sauce, onion salt, garlic salt, mustard, or ketchup on your food. ¨ Use less salt (or none) when recipes call for it. You can often use half the salt a recipe calls for without losing flavor. · Be physically active. Get at least 30 minutes of exercise on most days of the week. Walking is a good choice. You also may want to do other activities, such as running, swimming, cycling, or playing tennis or team sports. · Limit alcohol to 2 drinks a day for men and 1 drink a day for women. · Eat plenty of fruits, vegetables, and low-fat dairy products. Eat less saturated and total fats. How is high blood pressure treated? · Your doctor will suggest making lifestyle changes. For example, your doctor may ask you to eat healthy foods, quit smoking, lose extra weight, and be more active. · If lifestyle changes don't help enough or your blood pressure is very high, you will have to take medicine every day. Follow-up care is a key part of your treatment and safety. Be sure to make and go to all appointments, and call your doctor if you are having problems. It's also a good idea to know your test results and keep a list of the medicines you take. Where can you learn more? Go to http://nereyda-micki.info/. Enter P501 in the search box to learn more about \"Learning About High Blood Pressure. \" Current as of: May 10, 2017 Content Version: 11.7 © 9218-2810 Memetales. Care instructions adapted under license by Amware (which disclaims liability or warranty for this information). If you have questions about a medical condition or this instruction, always ask your healthcare professional. [place] [address] any warranty or liability for your use of this information. Learning About Benefits From Quitting Smoking How does quitting smoking make you healthier? If you're thinking about quitting smoking, you may have a few reasons to be smoke-free. Your health may be one of them. · When you quit smoking, you lower your risks for cancer, lung disease, heart attack, stroke, blood vessel disease, and blindness from macular degeneration. · When you're smoke-free, you get sick less often, and you heal faster. You are less likely to get colds, flu, bronchitis, and pneumonia. · As a nonsmoker, you may find that your mood is better and you are less stressed. When and how will you feel healthier? Quitting has real health benefits that start from day 1 of being smoke-free. And the longer you stay smoke-free, the healthier you get and the better you feel. The first hours · After just 20 minutes, your blood pressure and heart rate go down. That means there's less stress on your heart and blood vessels. · Within 12 hours, the level of carbon monoxide in your blood drops back to normal. That makes room for more oxygen. With more oxygen in your body, you may notice that you have more energy than when you smoked. After 2 weeks · Your lungs start to work better. · Your risk of heart attack starts to drop. After 1 month · When your lungs are clear, you cough less and breathe deeper, so it's easier to be active. · Your sense of taste and smell return. That means you can enjoy food more than you have since you started smoking. Over the years · After 1 year, your risk of heart disease is half what it would be if you kept smoking. · After 5 years, your risk of stroke starts to shrink. Within a few years after that, it's about the same as if you'd never smoked. · After 10 years, your risk of dying from lung cancer is cut by about half. And your risk for many other types of cancer is lower too. How would quitting help others in your life? When you quit smoking, you improve the health of everyone who now breathes in your smoke. · Their heart, lung, and cancer risks drop, much like yours. · They are sick less. For babies and small children, living smoke-free means they're less likely to have ear infections, pneumonia, and bronchitis. · If you're a woman who is or will be pregnant someday, quitting smoking means a healthier . · Children who are close to you are less likely to become adult smokers. Where can you learn more? Go to http://nereyda-micki.info/. Enter [id number] in the search box to learn more about \"Learning About Benefits From Quitting Smoking. \" 
 Current as of: November 29, 2017 Content Version: 11.7 © 9440-9730 Metabacus. Care instructions adapted under license by [employer] (which disclaims liability or warranty for this information). If you have questions about a medical condition or this instruction, always ask your healthcare professional. [address] any warranty or liability for your use of this information. Body Mass Index: Care Instructions Your Care Instructions Body mass index (BMI) can help you see if your weight is raising your risk for health problems. It uses a formula to compare how much you weigh with how tall you are. · A BMI lower than 18.5 is considered underweight. · A BMI between 18.5 and 24.9 is considered healthy. · A BMI between 25 and 29.9 is considered overweight. A BMI of 30 or higher is considered obese. If your BMI is in the normal range, it means that you have a lower risk for weight-related health problems. If your BMI is in the overweight or obese range, you may be at increased risk for weight-related health problems, such as high blood pressure, heart disease, stroke, arthritis or joint pain, and diabetes. If your BMI is in the underweight range, you may be at increased risk for health problems such as fatigue, lower protection (immunity) against illness, muscle loss, bone loss, hair loss, and hormone problems. BMI is just one measure of your risk for weight-related health problems. You may be at higher risk for health problems if you are not active, you eat an unhealthy diet, or you drink too much alcohol or use tobacco products. Follow-up care is a key part of your treatment and safety. Be sure to make and go to all appointments, and call your doctor if you are having problems. It's also a good idea to know your test results and keep a list of the medicines you take. How can you care for yourself at home? · Practice healthy eating habits. This includes eating plenty of fruits, vegetables, whole grains, lean protein, and low-fat dairy. · If your doctor recommends it, get more exercise. Walking is a good choice. Bit by bit, increase the amount you walk every day. Try for at least 30 minutes on most days of the week. · Do not smoke. Smoking can increase your risk for health problems. If you need help quitting, talk to your doctor about stop-smoking programs and medicines. These can increase your chances of quitting for good. · Limit alcohol to 2 drinks a day for men and 1 drink a day for women. Too much alcohol can cause health problems. If you have a BMI higher than 25 · Your doctor may do other tests to check your risk for weight-related health problems. This may include measuring the distance around your waist. A waist measurement of more than 40 inches in men or 35 inches in women can increase the risk of weight-related health problems. · Talk with your doctor about steps you can take to stay healthy or improve your health. You may need to make lifestyle changes to lose weight and stay healthy, such as changing your diet and getting regular exercise. If you have a BMI lower than 18.5 · Your doctor may do other tests to check your risk for health problems. · Talk with your doctor about steps you can take to stay healthy or improve your health. You may need to make lifestyle changes to gain or maintain weight and stay healthy, such as getting more healthy foods in your diet and doing exercises to build muscle. Where can you learn more? Go to http://nereyda-micki.info/. Enter S176 in the search box to learn more about \"Body Mass Index: Care Instructions. \" Current as of: October 13, 2016 Content Version: 11.4 © 0076-3478 [employer]um Baby.  Care instructions adapted under license by Oculus360 (which disclaims liability or warranty for this information). If you have questions about a medical condition or this instruction, always ask your healthcare professional. [place] [address] any warranty or liability for your use of this information. Medicare Wellness Visit, Male The best way to live healthy is to have a lifestyle where you eat a well-balanced diet, exercise regularly, limit alcohol use, and quit all forms of tobacco/nicotine, if applicable. Regular preventive services are another way to keep healthy. Preventive services (vaccines, screening tests, monitoring & exams) can help personalize your care plan, which helps you manage your own care. Screening tests can find health problems at the earliest stages, when they are easiest to treat. [address] follows the current, evidence-based guidelines published by the [facility] Juan Francisco Yousuf ([facility]) when recommending preventive services for our patients. Because we follow these guidelines, sometimes recommendations change over time as research supports it. (For example, a prostate screening blood test is no longer routinely recommended for men with no symptoms.) Of course, you and your provider may decide to screen more often for some diseases, based on your risk and co-morbidities (chronic disease you are already diagnosed with). Preventive services for you include: - Medicare offers their members a free annual wellness visit, which is time for you and your primary care provider to discuss and plan for your preventive service needs. Take advantage of this benefit every year! 
 
-All people over age 72 should receive the recommended pneumonia vaccines. Current USPSTF guidelines recommend a series of two vaccines for the best pneumonia protection.  
 
-All adults should have a yearly flu vaccine and a tetanus vaccine every 10 years. All adults age 61 years should receive a shingles vaccine once in their lifetime. -All adults age 38-68 years who are overweight should have a diabetes screening test once every three years.  
 
-Other screening tests & preventive services for persons with diabetes include: an eye exam to screen for diabetic retinopathy, a kidney function test, a foot exam, and stricter control over your cholesterol.  
 
-Cardiovascular screening for adults with routine risk involves an electrocardiogram (ECG) at intervals determined by the provider.  
 
-Colorectal cancer screenings should be done for adults age 54-65 years with normal risk. There are a number of acceptable methods of screening for this type of cancer. Each test has its own benefits and drawbacks. Discuss with your provider what is most appropriate for you during your annual wellness visit. The different tests include: colonoscopy (considered the best screening method), a fecal occult blood test, a fecal DNA test, and sigmoidoscopy. 
 
-All adults born between [facility] should be screened once for Hepatitis C. 
 
-An Abdominal Aortic Aneurysm (AAA) Screening is recommended for men age 73-68 who has ever smoked in their lifetime. Here is a list of your current Health Maintenance items (your personalized list of preventive services) with a due date: 
Health Maintenance Due Topic Date Due  Shingles Vaccine  10/31/2017 Arnel Obregon Annual Well Visit  03/14/2018  Stool testing for trace blood  05/01/2018 High Cholesterol: Care Instructions Your Care Instructions Cholesterol is a type of fat in your blood. It is needed for many body functions, such as making new cells. Cholesterol is made by your body. It also comes from food you eat. High cholesterol means that you have too much of the fat in your blood. This raises your risk of a heart attack and stroke. LDL and HDL are part of your total cholesterol. LDL is the \"bad\" cholesterol.  High LDL can raise your risk for heart disease, heart attack, and stroke. HDL is the \"good\" cholesterol. It helps clear bad cholesterol from the body. High HDL is linked with a lower risk of heart disease, heart attack, and stroke. Your cholesterol levels help your doctor find out your risk for having a heart attack or stroke. You and your doctor can talk about whether you need to lower your risk and what treatment is best for you. A heart-healthy lifestyle along with medicines can help lower your cholesterol and your risk. The way you choose to lower your risk will depend on how high your risk is for heart attack and stroke. It will also depend on how you feel about taking medicines. Follow-up care is a key part of your treatment and safety. Be sure to make and go to all appointments, and call your doctor if you are having problems. It's also a good idea to know your test results and keep a list of the medicines you take. How can you care for yourself at home? · Eat a variety of foods every day. Good choices include fruits, vegetables, whole grains (like oatmeal), dried beans and peas, nuts and seeds, soy products (like tofu), and fat-free or low-fat dairy products. · Replace butter, margarine, and hydrogenated or partially hydrogenated oils with olive and canola oils. (Canola oil margarine without trans fat is fine.) · Replace red meat with fish, poultry, and soy protein (like tofu). · Limit processed and packaged foods like chips, crackers, and cookies. · Bake, broil, or steam foods. Don't wood them. · Be physically active. Get at least 30 minutes of exercise on most days of the week. Walking is a good choice. You also may want to do other activities, such as running, swimming, cycling, or playing tennis or team sports. · Stay at a healthy weight or lose weight by making the changes in eating and physical activity listed above. Losing just a small amount of weight, even 5 to 10 pounds, can reduce your risk for having a heart attack or stroke. · Do not smoke. When should you call for help? Watch closely for changes in your health, and be sure to contact your doctor if: 
  · You need help making lifestyle changes.  
  · You have questions about your medicine. Where can you learn more? Go to http://nereyda-micki.info/. Enter Q019 in the search box to learn more about \"High Cholesterol: Care Instructions. \" Current as of: May 10, 2017 Content Version: 11.7 © 0749-3016 Helios [employer] Learning. Care instructions adapted under license by [employer]ocom[employer] (which disclaims liability or warranty for this information). If you have questions about a medical condition or this instruction, always ask your healthcare professional. [address] any warranty or liability for your use of this information. Advance Care Planning: Care Instructions Your Care Instructions It can be hard to live with an illness that cannot be cured. But if your health is getting worse, you may want to make decisions about end-of-life care. Planning for the end of your life does not mean that you are giving up. It is a way to make sure that your wishes are met. Clearly stating your wishes can make it easier for your loved ones. Making plans while you are still able may also ease your mind and make your final days less stressful and more meaningful. Follow-up care is a key part of your treatment and safety. Be sure to make and go to all appointments, and call your doctor if you are having problems. It's also a good idea to know your test results and keep a list of the medicines you take. What can you do to plan for the end of life? · You can bring these issues up with your doctor. You do not need to wait until your doctor starts the conversation. You might start with \"I would not be willing to live with . [place] Craig Plskip \" When you complete this sentence it helps your doctor understand your wishes. · Talk openly and honestly with your doctor. This is the best way to understand the decisions you will need to make as your health changes. Know that you can always change your mind. · Ask your doctor about commonly used life-support measures. These include tube feedings, breathing machines, and fluids given through a vein (IV). Understanding these treatments will help you decide whether you want them. · You may choose to have these life-supporting treatments for a limited time. This allows a trial period to see whether they will help you. You may also decide that you want your doctor to take only certain measures to keep you alive. It is important to spell out these conditions so that your doctor and family understand them. · Talk to your doctor about how long you are likely to live. He or she may be able to give you an idea of what usually happens with your specific illness. · Think about preparing papers that state your wishes. This way there will not be any confusion about what you want. You can change your instructions at any time. Which papers should you prepare? Advance directives are legal papers that tell doctors how you want to be cared for at the end of your life. You do not need a  to write these papers. Ask your doctor or your state [facility] department for information on how to write your advance directives. They may have the forms for each of these types of papers. Make sure your doctor has a copy of these on file, and give a copy to a family member or close friend. · Consider a do-not-resuscitate order (DNR). This order asks that no extra treatments be done if your heart stops or you stop breathing. Extra treatments may include cardiopulmonary resuscitation (CPR), electrical shock to restart your heart, or a machine to breathe for you. If you decide to have a DNR order, ask your doctor to explain and write it. Place the order in your home where everyone can easily see it. · Consider a living will. A living will explains your wishes about life support and other treatments at the end of your life if you become unable to speak for yourself. Living starks tell doctors to use or not use treatments that would keep you alive. You must have one or two witnesses or a notary present when you sign this form. · Consider a durable power of  for health care. This allows you to name a person to make decisions about your care if you are not able to. Most people ask a close friend or family member. Talk to this person about the kinds of treatments you want and those that you do not want. Make sure this person understands your wishes. These legal papers are simple to change. Tell your doctor what you want to change, and ask him or her to make a note in your medical file. Give your family updated copies of the papers. Where can you learn more? Go to http://nereyda-micki.info/. Enter P184 in the search box to learn more about \"Advance Care Planning: Care Instructions. \" Current as of: November 17, 2016 Content Version: 11.3 © 9563-3695 [employer]out[employer]. Care instructions adapted under license by [employer] (which disclaims liability or warranty for this information). If you have questions about a medical condition or this instruction, always ask your healthcare professional. [place] [address] any warranty or liability for your use of this information. Patient Instructions History Introducing [facility]! [employer] introduces "[employer]," patient portal. Now you can access parts of your medical record, email your doctor's office, and request medication refills online. 1. In your internet browser, go to [email or web address] Productivity 2. Click on the First Time User? Click Here link in the Sign In box. You will see the New Member Sign Up page. 3. Enter your RPost Access Code exactly as it appears below. You will not need to use this code after youve completed the sign-up process. If you do not sign up before the expiration date, you must request a new code. · RPost Access Code: [email or web address] Expires: 10/23/2018  3:06 PM 
 
4. Enter the last four digits of your Social Security Number (xxxx) and Date of Birth (mm/dd/yyyy) as indicated and click Submit. You will be taken to the next sign-up page. 5. Create a RPost ID. This will be your RPost login ID and cannot be changed, so think of one that is secure and easy to remember. 6. Create a RPost password. You can change your password at any time. 7. Enter your Password Reset Question and Answer. This can be used at a later time if you forget your password. 8. Enter your e-mail address. You will receive e-mail notification when new information is available in [address] 9. Click Sign Up. You can now view and download portions of your medical record. 10. Click the Download Summary menu link to download a portable copy of your medical information. If you have questions, please visit the Frequently Asked Questions section of the RPost website. Remember, RPost is NOT to be used for urgent needs. For medical emergencies, dial 911. Now available from your iPhone and Android! Please provide this summary of care documentation to your next provider. Your primary care clinician is listed as Kavon Lopez. If you have any questions after today's visit, please call [phone].

## 2018-07-25 NOTE — PATIENT INSTRUCTIONS
Learning About High Blood Pressure What is high blood pressure? Blood pressure is a measure of how hard the blood pushes against the walls of your arteries. It's normal for blood pressure to go up and down throughout the day, but if it stays up, you have high blood pressure. Another name for high blood pressure is hypertension. Two numbers tell you your blood pressure. The first number is the systolic pressure. It shows how hard the blood pushes when your heart is pumping. The second number is the diastolic pressure. It shows how hard the blood pushes between heartbeats, when your heart is relaxed and filling with blood. A blood pressure of less than 120/80 (say \"120 over 80\") is ideal for an adult. High blood pressure is 130/80 or higher. You have high blood pressure if your top number is 130 or higher or your bottom number is 80 or higher, or both. What happens when you have high blood pressure? · Blood flows through your arteries with too much force. Over time, this damages the walls of your arteries. But you can't feel it. High blood pressure usually doesn't cause symptoms. · Fat and calcium start to build up in your arteries. This buildup is called plaque. Plaque makes your arteries narrower and stiffer. Blood can't flow through them as easily. · This lack of good blood flow starts to damage some of the organs in your body. This can lead to problems such as coronary artery disease and heart attack, heart failure, stroke, kidney failure, and eye damage. How can you prevent high blood pressure? · Stay at a healthy weight. · Try to limit how much sodium you eat to less than 2,300 milligrams (mg) a day. If you limit your sodium to 1,500 mg a day, you can lower your blood pressure even more. ¨ Buy foods that are labeled \"unsalted,\" \"sodium-free,\" or \"low-sodium. \" Foods labeled \"reduced-sodium\" and \"light sodium\" may still have too much sodium.  
¨ Flavor your food with garlic, lemon juice, onion, vinegar, herbs, and spices instead of salt. Do not use soy sauce, steak sauce, onion salt, garlic salt, mustard, or ketchup on your food. ¨ Use less salt (or none) when recipes call for it. You can often use half the salt a recipe calls for without losing flavor. · Be physically active. Get at least 30 minutes of exercise on most days of the week. Walking is a good choice. You also may want to do other activities, such as running, swimming, cycling, or playing tennis or team sports. · Limit alcohol to 2 drinks a day for men and 1 drink a day for women. · Eat plenty of fruits, vegetables, and low-fat dairy products. Eat less saturated and total fats. How is high blood pressure treated? · Your doctor will suggest making lifestyle changes. For example, your doctor may ask you to eat healthy foods, quit smoking, lose extra weight, and be more active. · If lifestyle changes don't help enough or your blood pressure is very high, you will have to take medicine every day. Follow-up care is a key part of your treatment and safety. Be sure to make and go to all appointments, and call your doctor if you are having problems. It's also a good idea to know your test results and keep a list of the medicines you take. Where can you learn more? Go to http://nereyda-micki.info/. Enter P501 in the search box to learn more about \"Learning About High Blood Pressure. \" Current as of: May 10, 2017 Content Version: 11.7 © 3160-6984 [employer]. Care instructions adapted under license by [employer] (which disclaims liability or warranty for this information). If you have questions about a medical condition or this instruction, always ask your healthcare professional. [place] [address] any warranty or liability for your use of this information. Learning About Benefits From Quitting Smoking How does quitting smoking make you healthier?  
 
If you're thinking about quitting smoking, you may have a few reasons to be smoke-free. Your health may be one of them. · When you quit smoking, you lower your risks for cancer, lung disease, heart attack, stroke, blood vessel disease, and blindness from macular degeneration. · When you're smoke-free, you get sick less often, and you heal faster. You are less likely to get colds, flu, bronchitis, and pneumonia. · As a nonsmoker, you may find that your mood is better and you are less stressed. When and how will you feel healthier? Quitting has real health benefits that start from day 1 of being smoke-free. And the longer you stay smoke-free, the healthier you get and the better you feel. The first hours · After just 20 minutes, your blood pressure and heart rate go down. That means there's less stress on your heart and blood vessels. · Within 12 hours, the level of carbon monoxide in your blood drops back to normal. That makes room for more oxygen. With more oxygen in your body, you may notice that you have more energy than when you smoked. After 2 weeks · Your lungs start to work better. · Your risk of heart attack starts to drop. After 1 month · When your lungs are clear, you cough less and breathe deeper, so it's easier to be active. · Your sense of taste and smell return. That means you can enjoy food more than you have since you started smoking. Over the years · After 1 year, your risk of heart disease is half what it would be if you kept smoking. · After 5 years, your risk of stroke starts to shrink. Within a few years after that, it's about the same as if you'd never smoked. · After 10 years, your risk of dying from lung cancer is cut by about half. And your risk for many other types of cancer is lower too. How would quitting help others in your life? When you quit smoking, you improve the health of everyone who now breathes in your smoke. · Their heart, lung, and cancer risks drop, much like yours.  
· They are sick less. For babies and small children, living smoke-free means they're less likely to have ear infections, pneumonia, and bronchitis. · If you're a woman who is or will be pregnant someday, quitting smoking means a healthier . · Children who are close to you are less likely to become adult smokers. Where can you learn more? Go to http://nereyda-micki.info/. Enter [id number] in the search box to learn more about \"Learning About Benefits From Quitting Smoking. \" Current as of: 2017 Content Version: 11.7 © 2884-1199 [employer]. Care instructions adapted under license by [employer] (which disclaims liability or warranty for this information). If you have questions about a medical condition or this instruction, always ask your healthcare professional. [address] any warranty or liability for your use of this information. Body Mass Index: Care Instructions Your Care Instructions Body mass index (BMI) can help you see if your weight is raising your risk for health problems. It uses a formula to compare how much you weigh with how tall you are. · A BMI lower than 18.5 is considered underweight. · A BMI between 18.5 and 24.9 is considered healthy. · A BMI between 25 and 29.9 is considered overweight. A BMI of 30 or higher is considered obese. If your BMI is in the normal range, it means that you have a lower risk for weight-related health problems. If your BMI is in the overweight or obese range, you may be at increased risk for weight-related health problems, such as high blood pressure, heart disease, stroke, arthritis or joint pain, and diabetes. If your BMI is in the underweight range, you may be at increased risk for health problems such as fatigue, lower protection (immunity) against illness, muscle loss, bone loss, hair loss, and hormone problems.  
BMI is just one measure of your risk for weight-related health problems. You may be at higher risk for health problems if you are not active, you eat an unhealthy diet, or you drink too much alcohol or use tobacco products. Follow-up care is a key part of your treatment and safety. Be sure to make and go to all appointments, and call your doctor if you are having problems. It's also a good idea to know your test results and keep a list of the medicines you take. How can you care for yourself at home? · Practice healthy eating habits. This includes eating plenty of fruits, vegetables, whole grains, lean protein, and low-fat dairy. · If your doctor recommends it, get more exercise. Walking is a good choice. Bit by bit, increase the amount you walk every day. Try for at least 30 minutes on most days of the week. · Do not smoke. Smoking can increase your risk for health problems. If you need help quitting, talk to your doctor about stop-smoking programs and medicines. These can increase your chances of quitting for good. · Limit alcohol to 2 drinks a day for men and 1 drink a day for women. Too much alcohol can cause health problems. If you have a BMI higher than 25 · Your doctor may do other tests to check your risk for weight-related health problems. This may include measuring the distance around your waist. A waist measurement of more than 40 inches in men or 35 inches in women can increase the risk of weight-related health problems. · Talk with your doctor about steps you can take to stay healthy or improve your health. You may need to make lifestyle changes to lose weight and stay healthy, such as changing your diet and getting regular exercise. If you have a BMI lower than 18.5 · Your doctor may do other tests to check your risk for health problems. · Talk with your doctor about steps you can take to stay healthy or improve your health.  You may need to make lifestyle changes to gain or maintain weight and stay healthy, such as getting more healthy foods in your diet and doing exercises to build muscle. Where can you learn more? Go to http://nereyda-micki.info/. Enter S176 in the search box to learn more about \"Body Mass Index: Care Instructions. \" Current as of: October 13, 2016 Content Version: 11.4 © 7664-0787 [employer]. Care instructions adapted under license by [employer] (which disclaims liability or warranty for this information). If you have questions about a medical condition or this instruction, always ask your healthcare professional. [address] any warranty or liability for your use of this information. Medicare Wellness Visit, Male The best way to live healthy is to have a lifestyle where you eat a well-balanced diet, exercise regularly, limit alcohol use, and quit all forms of tobacco/nicotine, if applicable. Regular preventive services are another way to keep healthy. Preventive services (vaccines, screening tests, monitoring & exams) can help personalize your care plan, which helps you manage your own care. Screening tests can find health problems at the earliest stages, when they are easiest to treat. [facility] follows the current, evidence-based guidelines published by the [place] Juan Francisco Yousuf (USPSTF) when recommending preventive services for our patients. Because we follow these guidelines, sometimes recommendations change over time as research supports it. (For example, a prostate screening blood test is no longer routinely recommended for men with no symptoms.) Of course, you and your provider may decide to screen more often for some diseases, based on your risk and co-morbidities (chronic disease you are already diagnosed with). Preventive services for you include: - Medicare offers their members a free annual wellness visit, which is time for you and your primary care provider to discuss and plan for your preventive service needs. Take advantage of this benefit every year! 
 
-All people over age 72 should receive the recommended pneumonia vaccines. Current USPSTF guidelines recommend a series of two vaccines for the best pneumonia protection.  
 
-All adults should have a yearly flu vaccine and a tetanus vaccine every 10 years. All adults age 61 years should receive a shingles vaccine once in their lifetime.   
 
-All adults age 38-68 years who are overweight should have a diabetes screening test once every three years.  
 
-Other screening tests & preventive services for persons with diabetes include: an eye exam to screen for diabetic retinopathy, a kidney function test, a foot exam, and stricter control over your cholesterol.  
 
-Cardiovascular screening for adults with routine risk involves an electrocardiogram (ECG) at intervals determined by the provider.  
 
-Colorectal cancer screenings should be done for adults age 54-65 years with normal risk. There are a number of acceptable methods of screening for this type of cancer. Each test has its own benefits and drawbacks. Discuss with your provider what is most appropriate for you during your annual wellness visit. The different tests include: colonoscopy (considered the best screening method), a fecal occult blood test, a fecal DNA test, and sigmoidoscopy. 
 
-All adults born between [facility] should be screened once for Hepatitis C. 
 
-An Abdominal Aortic Aneurysm (AAA) Screening is recommended for men age 73-68 who has ever smoked in their lifetime. Here is a list of your current Health Maintenance items (your personalized list of preventive services) with a due date: 
Health Maintenance Due Topic Date Due  Shingles Vaccine  10/31/2017 [facility] Annual Well Visit  03/14/2018  Stool testing for trace blood  05/01/2018 High Cholesterol: Care Instructions Your Care Instructions Cholesterol is a type of fat in your blood.  It is needed for many body functions, such as making new cells. Cholesterol is made by your body. It also comes from food you eat. High cholesterol means that you have too much of the fat in your blood. This raises your risk of a heart attack and stroke. LDL and HDL are part of your total cholesterol. LDL is the \"bad\" cholesterol. High LDL can raise your risk for heart disease, heart attack, and stroke. HDL is the \"good\" cholesterol. It helps clear bad cholesterol from the body. High HDL is linked with a lower risk of heart disease, heart attack, and stroke. Your cholesterol levels help your doctor find out your risk for having a heart attack or stroke. You and your doctor can talk about whether you need to lower your risk and what treatment is best for you. A heart-healthy lifestyle along with medicines can help lower your cholesterol and your risk. The way you choose to lower your risk will depend on how high your risk is for heart attack and stroke. It will also depend on how you feel about taking medicines. Follow-up care is a key part of your treatment and safety. Be sure to make and go to all appointments, and call your doctor if you are having problems. It's also a good idea to know your test results and keep a list of the medicines you take. How can you care for yourself at home? · Eat a variety of foods every day. Good choices include fruits, vegetables, whole grains (like oatmeal), dried beans and peas, nuts and seeds, soy products (like tofu), and fat-free or low-fat dairy products. · Replace butter, margarine, and hydrogenated or partially hydrogenated oils with olive and canola oils. (Canola oil margarine without trans fat is fine.) · Replace red meat with fish, poultry, and soy protein (like tofu). · Limit processed and packaged foods like chips, crackers, and cookies. · Bake, broil, or steam foods. Don't wood them. · Be physically active. Get at least 30 minutes of exercise on most days of the week.  Walking is a good choice. You also may want to do other activities, such as running, swimming, cycling, or playing tennis or team sports. · Stay at a healthy weight or lose weight by making the changes in eating and physical activity listed above. Losing just a small amount of weight, even 5 to 10 pounds, can reduce your risk for having a heart attack or stroke. · Do not smoke. When should you call for help? Watch closely for changes in your health, and be sure to contact your doctor if: 
  · You need help making lifestyle changes.  
  · You have questions about your medicine. Where can you learn more? Go to http://nereydaVirtual Commandmicki.info/. Enter L055 in the search box to learn more about \"High Cholesterol: Care Instructions. \" Current as of: May 10, 2017 Content Version: 11.7 © 6524-9296 [email or web address]. Care instructions adapted under license by [employer] (which disclaims liability or warranty for this information). If you have questions about a medical condition or this instruction, always ask your healthcare professional. [place] [address] any warranty or liability for your use of this information. Advance Care Planning: Care Instructions Your Care Instructions It can be hard to live with an illness that cannot be cured. But if your health is getting worse, you may want to make decisions about end-of-life care. Planning for the end of your life does not mean that you are giving up. It is a way to make sure that your wishes are met. Clearly stating your wishes can make it easier for your loved ones. Making plans while you are still able may also ease your mind and make your final days less stressful and more meaningful. Follow-up care is a key part of your treatment and safety. Be sure to make and go to all appointments, and call your doctor if you are having problems.  It's also a good idea to know your test results and keep a list of the medicines you take. What can you do to plan for the end of life? · You can bring these issues up with your doctor. You do not need to wait until your doctor starts the conversation. You might start with \"I would not be willing to live with . Lagrange Mellow Lagrange Mellow Lagrange Mellow \" When you complete this sentence it helps your doctor understand your wishes. · Talk openly and honestly with your doctor. This is the best way to understand the decisions you will need to make as your health changes. Know that you can always change your mind. · Ask your doctor about commonly used life-support measures. These include tube feedings, breathing machines, and fluids given through a vein (IV). Understanding these treatments will help you decide whether you want them. · You may choose to have these life-supporting treatments for a limited time. This allows a trial period to see whether they will help you. You may also decide that you want your doctor to take only certain measures to keep you alive. It is important to spell out these conditions so that your doctor and family understand them. · Talk to your doctor about how long you are likely to live. He or she may be able to give you an idea of what usually happens with your specific illness. · Think about preparing papers that state your wishes. This way there will not be any confusion about what you want. You can change your instructions at any time. Which papers should you prepare? Advance directives are legal papers that tell doctors how you want to be cared for at the end of your life. You do not need a  to write these papers. Ask your doctor or your state health department for information on how to write your advance directives. They may have the forms for each of these types of papers. Make sure your doctor has a copy of these on file, and give a copy to a family member or close friend. · Consider a do-not-resuscitate order (DNR).  This order asks that no extra treatments be done if your heart stops or you stop breathing. Extra treatments may include cardiopulmonary resuscitation (CPR), electrical shock to restart your heart, or a machine to breathe for you. If you decide to have a DNR order, ask your doctor to explain and write it. Place the order in your home where everyone can easily see it. · Consider a living will. A living will explains your wishes about life support and other treatments at the end of your life if you become unable to speak for yourself. Living starks tell doctors to use or not use treatments that would keep you alive. You must have one or two witnesses or a notary present when you sign this form. · Consider a durable power of  for health care. This allows you to name a person to make decisions about your care if you are not able to. Most people ask a close friend or family member. Talk to this person about the kinds of treatments you want and those that you do not want. Make sure this person understands your wishes. These legal papers are simple to change. Tell your doctor what you want to change, and ask him or her to make a note in your medical file. Give your family updated copies of the papers. Where can you learn more? Go to http://nereyda-micki.info/. Enter P184 in the search box to learn more about \"Advance Care Planning: Care Instructions. \" Current as of: November 17, 2016 Content Version: 11.3 © 0413-0051 [employer]. Care instructions adapted under license by [employer] (which disclaims liability or warranty for this information). If you have questions about a medical condition or this instruction, always ask your healthcare professional. [place] [address] any warranty or liability for your use of this information.

## 2018-08-09 LAB — HEMOCCULT STL QL IA: NEGATIVE

## 2018-09-11 RX ORDER — AMLODIPINE BESYLATE 10 MG/1
TABLET ORAL
Qty: 90 TAB | Refills: 0 | Status: SHIPPED | OUTPATIENT
Start: 2018-09-11 | End: 2018-11-28 | Stop reason: SDUPTHER

## 2018-11-28 ENCOUNTER — OFFICE VISIT (OUTPATIENT)
Dept: FAMILY MEDICINE CLINIC | Age: 63
End: 2018-11-28

## 2018-11-28 VITALS
OXYGEN SATURATION: 98 % | HEART RATE: 96 BPM | TEMPERATURE: 95.5 F | SYSTOLIC BLOOD PRESSURE: 136 MMHG | HEIGHT: 68 IN | DIASTOLIC BLOOD PRESSURE: 70 MMHG | WEIGHT: 181.2 LBS | RESPIRATION RATE: 18 BRPM | BODY MASS INDEX: 27.46 KG/M2

## 2018-11-28 DIAGNOSIS — E78.00 HYPERCHOLESTEROLEMIA: Primary | ICD-10-CM

## 2018-11-28 DIAGNOSIS — Z72.0 TOBACCO ABUSE DISORDER: ICD-10-CM

## 2018-11-28 RX ORDER — ROSUVASTATIN CALCIUM 20 MG/1
TABLET, COATED ORAL
Qty: 90 TAB | Refills: 1 | Status: SHIPPED | OUTPATIENT
Start: 2018-11-28 | End: 2019-05-28 | Stop reason: SDUPTHER

## 2018-11-28 RX ORDER — ASPIRIN 81 MG/1
81 TABLET ORAL DAILY
Qty: 90 TAB | Refills: 3 | Status: SHIPPED | OUTPATIENT
Start: 2018-11-28 | End: 2019-05-28 | Stop reason: SDUPTHER

## 2018-11-28 RX ORDER — LOSARTAN POTASSIUM AND HYDROCHLOROTHIAZIDE 25; 100 MG/1; MG/1
1 TABLET ORAL DAILY
Qty: 90 TAB | Refills: 2 | Status: CANCELLED | OUTPATIENT
Start: 2018-11-28

## 2018-11-28 RX ORDER — AMLODIPINE BESYLATE 10 MG/1
TABLET ORAL
Qty: 90 TAB | Refills: 3 | Status: SHIPPED | OUTPATIENT
Start: 2018-11-28 | End: 2019-05-28 | Stop reason: SDUPTHER

## 2018-11-28 NOTE — PROGRESS NOTES
Name and  verified Chief Complaint Patient presents with  Hypertension 4 month f/u Health Maintenance reviewed-discussed with patient. Patient unsure of name of blood pressure medication encouraged to bring current medication or list to each office visit he acknowledged understanding. 1. Have you been to the ER, urgent care clinic since your last visit? Hospitalized since your last visit? no 
 
2. Have you seen or consulted any other health care providers outside of the [address] [place] [address] since your last visit? Include any pap smears or colon screening.  no

## 2018-11-28 NOTE — PROGRESS NOTES
HISTORY OF PRESENT ILLNESS Kenia Haynes is a 63 y.o. male. HPI With hx of CVA in 2010 with HTN, hyperchol, +tobacco abuse slowed down but 1/2 ppd/40ys, in fasting state, no myalgia 
today pt present for Bp check and the patient stating that so far there has been a Compliancy w/ the bp meds, he is having had the low salt diet ,the patient has been active life style and does do the daily walking,  pt states that patien does not obtain the bp at home few times a week atoday the pt denies Chest Pain, has no legs swelling no lightheadedness, 
the pat has not been feeling anxious, and  Has not been feeling stressed out,  
otherwise feeling better since the last visit Current Outpatient Medications Medication Sig Dispense Refill  rosuvastatin (CRESTOR) 20 mg tablet TAKE 1 TABLET BY MOUTH EVERY NIGHT 90 Tab 1  
 aspirin delayed-release 81 mg tablet Take 81 mg by mouth daily.  amLODIPine (NORVASC) 10 mg tablet TAKE 1 TABLET BY MOUTH EVERY DAY 90 Tab 0  
 losartan-hydroCHLOROthiazide (HYZAAR) 100-25 mg per tablet Take 1 Tab by mouth daily. 90 Tab 2 No Known Allergies Past Medical History:  
Diagnosis Date  Acute MI, inferoposterior wall, initial episode of care [facility]) 4/22/2011  Arthritis  CAD (coronary artery disease) 4/23/2011  CAD (coronary artery disease), native coronary artery 4/22/2011  Chronic pain  CVA, old, speech/language deficit 4/12/2017  Encounter for immunization 1/25/2018  
 HTN, goal below 130/80 4/12/2017  Hypercholesterolemia 4/12/2017  Hypertension 4/22/2011  Stroke ([facility] [address]  Tobacco abuse disorder 4/12/2017  Unstable angina ([address] 4/22/2011 History reviewed. No pertinent surgical history. Family History Problem Relation Age of Onset  Dementia Mother  Emphysema Father  Hypertension Father Social History Tobacco Use  Smoking status: Current Every Day Smoker Packs/day: 1.00   Years: 35.00  
 Pack years: 35.00  Smokeless tobacco: Never Used  Tobacco comment: 2 packs of cigs last 1 month Substance Use Topics  Alcohol use: No  
  
Lab Results Component Value Date/Time WBC 6.6 04/12/2017 10:49 AM  
 HGB 10.9 (L) 04/12/2017 10:49 AM  
 HCT 35.7 (L) 04/12/2017 10:49 AM  
 PLATELET 940 04/94/7352 10:49 AM  
 MCV 82 04/12/2017 10:49 AM  
 
Lab Results Component Value Date/Time GFR est non-AA 98 04/12/2017 10:49 AM  
 GFR est  04/12/2017 10:49 AM  
 Creatinine 0.80 04/12/2017 10:49 AM  
 BUN 10 04/12/2017 10:49 AM  
 Sodium 140 04/12/2017 10:49 AM  
 Potassium 4.5 04/12/2017 10:49 AM  
 Chloride 102 04/12/2017 10:49 AM  
 CO2 25 04/12/2017 10:49 AM  
  
Review of Systems Constitutional: Negative for chills and fever. HENT: Negative for ear pain and nosebleeds. Eyes: Negative for blurred vision, pain and discharge. Respiratory: Negative for shortness of breath. Cardiovascular: Negative for chest pain and leg swelling. Gastrointestinal: Negative for constipation, diarrhea, nausea and vomiting. Genitourinary: Negative for frequency. Musculoskeletal: Negative for joint pain. Skin: Negative for itching and rash. Neurological: Negative for headaches. Psychiatric/Behavioral: Negative for depression. The patient is not nervous/anxious. Physical Exam  
Constitutional: He is oriented to person, place, and time. He appears well-developed and well-nourished. HENT:  
Head: Normocephalic and atraumatic. Mouth/Throat: No oropharyngeal exudate. Eyes: Conjunctivae and EOM are normal.  
Neck: Normal range of motion. Neck supple. Cardiovascular: Normal rate, regular rhythm and normal heart sounds. No murmur heard. Pulmonary/Chest: Effort normal and breath sounds normal. No respiratory distress. Abdominal: Soft. Bowel sounds are normal. He exhibits no distension. There is no rebound. Musculoskeletal: He exhibits no edema or tenderness. Neurological: He is alert and oriented to person, place, and time. Skin: Skin is warm. No erythema. Psychiatric: He has a normal mood and affect. His behavior is normal.  
Nursing note and vitals reviewed. ASSESSMENT and PLAN Diagnoses and all orders for this visit: 
 
1. Hypercholesterolemia 
-     CBC W/O DIFF 
-     LIPID PANEL; Future -     METABOLIC PANEL, COMPREHENSIVE; Future 
-     TSH 3RD GENERATION 2. Tobacco abuse disorder 
-     CBC W/O DIFF 
-     LIPID PANEL; Future -     METABOLIC PANEL, COMPREHENSIVE; Future 
-     TSH 3RD GENERATION Other orders 
-     rosuvastatin (CRESTOR) 20 mg tablet; TAKE 1 TABLET BY MOUTH EVERY NIGHT 
-     amLODIPine (NORVASC) 10 mg tablet; TAKE 1 TABLET BY MOUTH EVERY DAY 
-     aspirin delayed-release 81 mg tablet; Take 1 Tab by mouth daily. HTN controlled on the repeat, no change of bp meds at this time,  Discussed sodium restriction, high k rich diet,  maintaining ideal body weight and regular exercise program such as daily walking 30 min perday 4-5 times per week,  medication compliance advised, was told to call back for rfs or of any concern

## 2018-11-28 NOTE — PATIENT INSTRUCTIONS
High Blood Pressure: Care Instructions Your Care Instructions If your blood pressure is usually above 130/80, you have high blood pressure, or hypertension. That means the top number is 130 or higher or the bottom number is 80 or higher, or both. Despite what a lot of people think, high blood pressure usually doesn't cause headaches or make you feel dizzy or lightheaded. It usually has no symptoms. But it does increase your risk for heart attack, stroke, and kidney or eye damage. The higher your blood pressure, the more your risk increases. Your doctor will give you a goal for your blood pressure. Your goal will be based on your health and your age. Lifestyle changes, such as eating healthy and being active, are always important to help lower blood pressure. You might also take medicine to reach your blood pressure goal. 
Follow-up care is a key part of your treatment and safety. Be sure to make and go to all appointments, and call your doctor if you are having problems. It's also a good idea to know your test results and keep a list of the medicines you take. How can you care for yourself at home? Medical treatment · If you stop taking your medicine, your blood pressure will go back up. You may take one or more types of medicine to lower your blood pressure. Be safe with medicines. Take your medicine exactly as prescribed. Call your doctor if you think you are having a problem with your medicine. · Talk to your doctor before you start taking aspirin every day. Aspirin can help certain people lower their risk of a heart attack or stroke. But taking aspirin isn't right for everyone, because it can cause serious bleeding. · See your doctor regularly. You may need to see the doctor more often at first or until your blood pressure comes down. · If you are taking blood pressure medicine, talk to your doctor before you take decongestants or anti-inflammatory medicine, such as ibuprofen. Some of these medicines can raise blood pressure. · Learn how to check your blood pressure at home. Lifestyle changes · Stay at a healthy weight. This is especially important if you put on weight around the waist. Losing even 10 pounds can help you lower your blood pressure. · If your doctor recommends it, get more exercise. Walking is a good choice. Bit by bit, increase the amount you walk every day. Try for at least 30 minutes on most days of the week. You also may want to swim, bike, or do other activities. · Avoid or limit alcohol. Talk to your doctor about whether you can drink any alcohol. · Try to limit how much sodium you eat to less than 2,300 milligrams (mg) a day. Your doctor may ask you to try to eat less than 1,500 mg a day. · Eat plenty of fruits (such as bananas and oranges), vegetables, legumes, whole grains, and low-fat dairy products. · Lower the amount of saturated fat in your diet. Saturated fat is found in animal products such as milk, cheese, and meat. Limiting these foods may help you lose weight and also lower your risk for heart disease. · Do not smoke. Smoking increases your risk for heart attack and stroke. If you need help quitting, talk to your doctor about stop-smoking programs and medicines. These can increase your chances of quitting for good. When should you call for help? Call 911 anytime you think you may need emergency care. This may mean having symptoms that suggest that your blood pressure is causing a serious heart or blood vessel problem. Your blood pressure may be over 180/120. 
 For example, call 911 if: 
  · You have symptoms of a heart attack. These may include: 
? Chest pain or pressure, or a strange feeling in the chest. 
? Sweating. ? Shortness of breath. ? Nausea or vomiting. ? Pain, pressure, or a strange feeling in the back, neck, jaw, or upper belly or in one or both shoulders or arms. ? Lightheadedness or sudden weakness. ? A fast or irregular heartbeat.  
  · You have symptoms of a stroke. These may include: 
? Sudden numbness, tingling, weakness, or loss of movement in your face, arm, or leg, especially on only one side of your body. ? Sudden vision changes. ? Sudden trouble speaking. ? Sudden confusion or trouble understanding simple statements. ? Sudden problems with walking or balance. ? A sudden, severe headache that is different from past headaches.  
  · You have severe back or belly pain.  
 Do not wait until your blood pressure comes down on its own. Get help right away. 
 Call your doctor now or seek immediate care if: 
  · Your blood pressure is much higher than normal (such as 180/120 or higher), but you don't have symptoms.  
  · You think high blood pressure is causing symptoms, such as: 
? Severe headache. 
? Blurry vision.  
 Watch closely for changes in your health, and be sure to contact your doctor if: 
  · Your blood pressure measures higher than your doctor recommends at least 2 times. That means the top number is higher or the bottom number is higher, or both.  
  · You think you may be having side effects from your blood pressure medicine. Where can you learn more? Go to http://nereyda-micki.info/. Enter J235 in the search box to learn more about \"High Blood Pressure: Care Instructions. \" Current as of: December 6, 2017 Content Version: 11.8 © 9108-4740 [employer]. Care instructions adapted under license by [employer] (which disclaims liability or warranty for this information). If you have questions about a medical condition or this instruction, always ask your healthcare professional. [place] [address] any warranty or liability for your use of this information.

## 2018-11-29 LAB
ALBUMIN SERPL-MCNC: 4.3 G/DL (ref 3.6–4.8)
ALBUMIN/GLOB SERPL: 1.7 {RATIO} (ref 1.2–2.2)
ALP SERPL-CCNC: 65 IU/L (ref 39–117)
ALT SERPL-CCNC: 7 IU/L (ref 0–44)
AST SERPL-CCNC: 15 IU/L (ref 0–40)
BILIRUB SERPL-MCNC: 0.6 MG/DL (ref 0–1.2)
BUN SERPL-MCNC: 6 MG/DL (ref 8–27)
BUN/CREAT SERPL: 7 (ref 10–24)
CALCIUM SERPL-MCNC: 9.2 MG/DL (ref 8.6–10.2)
CHLORIDE SERPL-SCNC: 103 MMOL/L (ref 96–106)
CHOLEST SERPL-MCNC: 151 MG/DL (ref 100–199)
CO2 SERPL-SCNC: 25 MMOL/L (ref 20–29)
CREAT SERPL-MCNC: 0.91 MG/DL (ref 0.76–1.27)
ERYTHROCYTE [DISTWIDTH] IN BLOOD BY AUTOMATED COUNT: 14.4 % (ref 12.3–15.4)
GLOBULIN SER CALC-MCNC: 2.6 G/DL (ref 1.5–4.5)
GLUCOSE SERPL-MCNC: 87 MG/DL (ref 65–99)
HCT VFR BLD AUTO: 43.2 % (ref 37.5–51)
HDLC SERPL-MCNC: 32 MG/DL
HGB BLD-MCNC: 14.8 G/DL (ref 13–17.7)
LDLC SERPL CALC-MCNC: 99 MG/DL (ref 0–99)
MCH RBC QN AUTO: 31 PG (ref 26.6–33)
MCHC RBC AUTO-ENTMCNC: 34.3 G/DL (ref 31.5–35.7)
MCV RBC AUTO: 91 FL (ref 79–97)
PLATELET # BLD AUTO: 244 X10E3/UL (ref 150–379)
POTASSIUM SERPL-SCNC: 4.2 MMOL/L (ref 3.5–5.2)
PROT SERPL-MCNC: 6.9 G/DL (ref 6–8.5)
RBC # BLD AUTO: 4.77 X10E6/UL (ref 4.14–5.8)
SODIUM SERPL-SCNC: 141 MMOL/L (ref 134–144)
TRIGL SERPL-MCNC: 98 MG/DL (ref 0–149)
TSH SERPL DL<=0.005 MIU/L-ACNC: 1.05 UIU/ML (ref 0.45–4.5)
VLDLC SERPL CALC-MCNC: 20 MG/DL (ref 5–40)
WBC # BLD AUTO: 7.4 X10E3/UL (ref 3.4–10.8)

## 2019-05-28 ENCOUNTER — OFFICE VISIT (OUTPATIENT)
Dept: FAMILY MEDICINE CLINIC | Age: 64
End: 2019-05-28

## 2019-05-28 VITALS
RESPIRATION RATE: 18 BRPM | WEIGHT: 175.4 LBS | HEART RATE: 69 BPM | TEMPERATURE: 97.6 F | OXYGEN SATURATION: 98 % | BODY MASS INDEX: 26.58 KG/M2 | DIASTOLIC BLOOD PRESSURE: 77 MMHG | SYSTOLIC BLOOD PRESSURE: 125 MMHG | HEIGHT: 68 IN

## 2019-05-28 DIAGNOSIS — Z23 ENCOUNTER FOR IMMUNIZATION: Primary | ICD-10-CM

## 2019-05-28 DIAGNOSIS — I69.359 CVA, OLD, HEMIPARESIS (HCC): ICD-10-CM

## 2019-05-28 DIAGNOSIS — Z72.0 TOBACCO ABUSE DISORDER: ICD-10-CM

## 2019-05-28 DIAGNOSIS — I63.89 CEREBROVASCULAR ACCIDENT (CVA) DUE TO OTHER MECHANISM (HCC): ICD-10-CM

## 2019-05-28 DIAGNOSIS — I25.119 CORONARY ARTERY DISEASE INVOLVING NATIVE CORONARY ARTERY WITH ANGINA PECTORIS, UNSPECIFIED WHETHER NATIVE OR TRANSPLANTED HEART (HCC): ICD-10-CM

## 2019-05-28 DIAGNOSIS — I69.328 CVA, OLD, SPEECH/LANGUAGE DEFICIT: ICD-10-CM

## 2019-05-28 DIAGNOSIS — E78.00 HYPERCHOLESTEROLEMIA: ICD-10-CM

## 2019-05-28 PROBLEM — I63.9 STROKE (HCC): Status: ACTIVE | Noted: 2019-05-28

## 2019-05-28 RX ORDER — AMLODIPINE BESYLATE 10 MG/1
TABLET ORAL
Qty: 90 TAB | Refills: 3 | Status: SHIPPED | OUTPATIENT
Start: 2019-05-28 | End: 2020-01-29 | Stop reason: SDUPTHER

## 2019-05-28 RX ORDER — ROSUVASTATIN CALCIUM 20 MG/1
TABLET, COATED ORAL
Qty: 90 TAB | Refills: 1 | Status: SHIPPED | OUTPATIENT
Start: 2019-05-28 | End: 2020-01-29 | Stop reason: SDUPTHER

## 2019-05-28 RX ORDER — LOSARTAN POTASSIUM AND HYDROCHLOROTHIAZIDE 25; 100 MG/1; MG/1
1 TABLET ORAL DAILY
Qty: 90 TAB | Refills: 2 | Status: SHIPPED | OUTPATIENT
Start: 2019-05-28 | End: 2019-07-29 | Stop reason: ALTCHOICE

## 2019-05-28 RX ORDER — ASPIRIN 81 MG/1
81 TABLET ORAL DAILY
Qty: 90 TAB | Refills: 3 | Status: SHIPPED | OUTPATIENT
Start: 2019-05-28 | End: 2020-01-29 | Stop reason: SDUPTHER

## 2019-05-28 NOTE — PROGRESS NOTES
Name and  verified. Chief Complaint   Patient presents with    Hypertension     6 month follow up     Patient declined pneumococcal vaccine. Health Maintenance reviewed-discussed with patient. 1. Have you been to the ER, urgent care clinic since your last visit? Hospitalized since your last visit? no    2. Have you seen or consulted any other health care providers outside of the [address] [place] [address] since your last visit? Include any pap smears or colon screening.  no

## 2019-05-28 NOTE — PATIENT INSTRUCTIONS
Learning About Emotional Changes After a Stroke Introduction After a stroke, many people feel different without knowing why. For example, some people find it hard to control their emotions. They may cry or laugh for no reason. Or they may feel down or even hopeless. Some people may find they're acting differently. They may act too quickly or on impulse. Or they may be more anxious and hesitant at times. If these changes happen to you, they can be upsetting. And they can be confusing to you and your loved ones. But these changes may get better with time as your brain heals. Let your loved ones know what's happening. Their support and understanding can help you deal with these feelings. And with time and support from the people around you, you can learn ways to adjust to life after a stroke. How can a stroke affect your emotions? After a stroke, some people feel like they have lost control of their emotions. These feelings can come from one or both of these two causes: · A stroke can affect parts of the brain that control how you feel. · A stroke can leave you with upsetting body changes that take away some of your independence. For example, some people may feel: · Sad or angry about the loss of the lifestyle they had before. · Isolated by speech and language problems. · Frustrated by the slow pace of recovery. · Worried about the future. These feelings are normal and expected. These strong feelings are more likely to happen if you need to stay in bed for long periods of time. And it is more likely to be a problem at night. It may help to have a radio playing softly in the bedroom or a dim light beside the bed. How can you deal with your emotions after a stroke? To deal with your emotions: 
· Be easy on yourself. Let go of mistakes. · Give yourself credit for the progress you have made. · Make time for things that you enjoy. · Join a stroke support group. Your rehab team or local hospital can help you find one. Is depression common? It is common to feel sad about changes caused by the stroke. Sometimes the injury to the brain from the stroke can cause depression. If you think you might be depressed, tell your doctor right away. The sooner you know if you are depressed, the sooner you can get treatment. Treatment can help you feel better. Your doctor will want to know if in the past 2 weeks: 
· You have lost interest or pleasure in doing things. · You have been feeling sad, hopeless, or empty. Your doctor may also ask about sleep troubles or changes in eating. How can friends and family help? Your loved ones can help you by following these tips: · A person who has had a stroke may tend to have strong emotional reactions. Remember that these are a result of the stroke. Try not to become too upset by them. · Don't avoid a loved one who's had a stroke. Contact with and support from family members is very important to recovery. · Watch for signs of depression in people who have had a stroke. Urge them to talk to their doctor if they think they might be depressed. Follow-up care is a key part of your treatment and safety. Be sure to make and go to all appointments, and call your doctor if you are having problems. It's also a good idea to know your test results and keep a list of the medicines you take. Where can you learn more? Go to http://nereyda-micki.info/. Enter 427 0057 in the search box to learn more about \"Learning About Emotional Changes After a Stroke. \" Current as of: September 26, 2018 Content Version: 11.9 © 2264-0124 [employer]. Care instructions adapted under license by [employer] (which disclaims liability or warranty for this information).  If you have questions about a medical condition or this instruction, always ask your healthcare professional. [employer] disclaims any warranty or liability for your use of this information. DASH Diet: Care Instructions Your Care Instructions The DASH diet is an eating plan that can help lower your blood pressure. DASH stands for Dietary Approaches to Stop Hypertension. Hypertension is high blood pressure. The DASH diet focuses on eating foods that are high in calcium, potassium, and magnesium. These nutrients can lower blood pressure. The foods that are highest in these nutrients are fruits, vegetables, low-fat dairy products, nuts, seeds, and legumes. But taking calcium, potassium, and magnesium supplements instead of eating foods that are high in those nutrients does not have the same effect. The DASH diet also includes whole grains, fish, and poultry. The DASH diet is one of several lifestyle changes your doctor may recommend to lower your high blood pressure. Your doctor may also want you to decrease the amount of sodium in your diet. Lowering sodium while following the DASH diet can lower blood pressure even further than just the DASH diet alone. Follow-up care is a key part of your treatment and safety. Be sure to make and go to all appointments, and call your doctor if you are having problems. It's also a good idea to know your test results and keep a list of the medicines you take. How can you care for yourself at home? Following the DASH diet · Eat 4 to 5 servings of fruit each day. A serving is 1 medium-sized piece of fruit, ½ cup chopped or canned fruit, 1/4 cup dried fruit, or 4 ounces (½ cup) of fruit juice. Choose fruit more often than fruit juice. · Eat 4 to 5 servings of vegetables each day. A serving is 1 cup of lettuce or raw leafy vegetables, ½ cup of chopped or cooked vegetables, or 4 ounces (½ cup) of vegetable juice. Choose vegetables more often than vegetable juice. · Get 2 to 3 servings of low-fat and fat-free dairy each day.  A serving is 8 ounces of milk, 1 cup of yogurt, or 1 ½ ounces of cheese. · Eat 6 to 8 servings of grains each day. A serving is 1 slice of bread, 1 ounce of dry cereal, or ½ cup of cooked rice, pasta, or cooked cereal. Try to choose whole-grain products as much as possible. · Limit lean meat, poultry, and fish to 2 servings each day. A serving is 3 ounces, about the size of a deck of cards. · Eat 4 to 5 servings of nuts, seeds, and legumes (cooked dried beans, lentils, and split peas) each week. A serving is 1/3 cup of nuts, 2 tablespoons of seeds, or ½ cup of cooked beans or peas. · Limit fats and oils to 2 to 3 servings each day. A serving is 1 teaspoon of vegetable oil or 2 tablespoons of salad dressing. · Limit sweets and added sugars to 5 servings or less a week. A serving is 1 tablespoon jelly or jam, ½ cup sorbet, or 1 cup of lemonade. · Eat less than 2,300 milligrams (mg) of sodium a day. If you limit your sodium to 1,500 mg a day, you can lower your blood pressure even more. Tips for success · Start small. Do not try to make dramatic changes to your diet all at once. You might feel that you are missing out on your favorite foods and then be more likely to not follow the plan. Make small changes, and stick with them. Once those changes become habit, add a few more changes. · Try some of the following: ? Make it a goal to eat a fruit or vegetable at every meal and at snacks. This will make it easy to get the recommended amount of fruits and vegetables each day. ? Try yogurt topped with fruit and nuts for a snack or healthy dessert. ? Add lettuce, tomato, cucumber, and onion to sandwiches. ? Combine a ready-made pizza crust with low-fat mozzarella cheese and lots of vegetable toppings. Try using tomatoes, squash, spinach, broccoli, carrots, cauliflower, and onions. ? Have a variety of cut-up vegetables with a low-fat dip as an appetizer instead of chips and dip. ? Sprinkle sunflower seeds or chopped almonds over salads. Or try adding chopped walnuts or almonds to cooked vegetables. ? Try some vegetarian meals using beans and peas. Add garbanzo or kidney beans to salads. Make burritos and tacos with mashed asher beans or black beans. Where can you learn more? Go to http://nereyda-micki.info/. Enter N534 in the search box to learn more about \"DASH Diet: Care Instructions. \" Current as of: July 22, 2018 Content Version: 11.9 © 8243-4604 [employer]. Care instructions adapted under license by [employer] (which disclaims liability or warranty for this information). If you have questions about a medical condition or this instruction, always ask your healthcare professional. [address] any warranty or liability for your use of this information. Body Mass Index: Care Instructions Your Care Instructions Body mass index (BMI) can help you see if your weight is raising your risk for health problems. It uses a formula to compare how much you weigh with how tall you are. · A BMI lower than 18.5 is considered underweight. · A BMI between 18.5 and 24.9 is considered healthy. · A BMI between 25 and 29.9 is considered overweight. A BMI of 30 or higher is considered obese. If your BMI is in the normal range, it means that you have a lower risk for weight-related health problems. If your BMI is in the overweight or obese range, you may be at increased risk for weight-related health problems, such as high blood pressure, heart disease, stroke, arthritis or joint pain, and diabetes. If your BMI is in the underweight range, you may be at increased risk for health problems such as fatigue, lower protection (immunity) against illness, muscle loss, bone loss, hair loss, and hormone problems. BMI is just one measure of your risk for weight-related health problems. You may be at higher risk for health problems if you are not active, you eat an unhealthy diet, or you drink too much alcohol or use tobacco products. Follow-up care is a key part of your treatment and safety. Be sure to make and go to all appointments, and call your doctor if you are having problems. It's also a good idea to know your test results and keep a list of the medicines you take. How can you care for yourself at home? · Practice healthy eating habits. This includes eating plenty of fruits, vegetables, whole grains, lean protein, and low-fat dairy. · If your doctor recommends it, get more exercise. Walking is a good choice. Bit by bit, increase the amount you walk every day. Try for at least 30 minutes on most days of the week. · Do not smoke. Smoking can increase your risk for health problems. If you need help quitting, talk to your doctor about stop-smoking programs and medicines. These can increase your chances of quitting for good. · Limit alcohol to 2 drinks a day for men and 1 drink a day for women. Too much alcohol can cause health problems. If you have a BMI higher than 25 · Your doctor may do other tests to check your risk for weight-related health problems. This may include measuring the distance around your waist. A waist measurement of more than 40 inches in men or 35 inches in women can increase the risk of weight-related health problems. · Talk with your doctor about steps you can take to stay healthy or improve your health. You may need to make lifestyle changes to lose weight and stay healthy, such as changing your diet and getting regular exercise. If you have a BMI lower than 18.5 · Your doctor may do other tests to check your risk for health problems. · Talk with your doctor about steps you can take to stay healthy or improve your health.  You may need to make lifestyle changes to gain or maintain weight and stay healthy, such as getting more healthy foods in your diet and doing exercises to build muscle. Where can you learn more? Go to http://nereyda-micki.info/. Enter S176 in the search box to learn more about \"Body Mass Index: Care Instructions. \" Current as of: October 13, 2016 Content Version: 11.4 © 9222-8373 [employer]. Care instructions adapted under license by [employer] (which disclaims liability or warranty for this information). If you have questions about a medical condition or this instruction, always ask your healthcare professional. [address] any warranty or liability for your use of this information.

## 2019-05-28 NOTE — PROGRESS NOTES
HISTORY OF PRESENT ILLNESS  Cornell Moon is a 66 y.o. male.   HPI   Hx of stroke in 2010, rt sided weakness, HTN, choles, patient states that he never had any heart attack or any coronary artery disease friend of his was using his identity and he is the one who had all the heart trouble in the past not himself not the patient at this time patient also state that his friends got into trouble because he used his identity for 25 months  Patient presents with history of cerebrovascular accident approximately 6-8 months ago patient is able to talk no blurry vision unfortunately the stroke did leave the patient with right-sided hemiparesis upper extremity worse than lower extremity on the right size currently patient walks with a cane and  does not have a good balance secondary to the right-sided weakness patient also has some muscle atrophy and right upper hand deformity patient has been compliant with prescribed medications, at this time patient denies any chest pain and is not dizzy unfortunately the right side become spasmic accordingly patient has no antispasmodic medication to be used    HTN  Today pt present for Bp check and++ Compliancy w/ the bp meds, having had the low salt diet ,  has been active, patien does not obtain the bp at home ,, today the pt denies Chest Pain, has no legs swelling no lightheadedness,  In addition patient has history of hypercholesterolemia has been compliant with the statin therapy denies any muscle ache, currently taking the medication nightly last lipid panel was reviewed and was normal ++++refills needed at this time has been trying to have a low-fat low-cholesterol diet sometimes likes the fast foods weight has been stable      Current Outpatient Medications   Medication Sig Dispense Refill    rosuvastatin (CRESTOR) 20 mg tablet TAKE 1 TABLET BY MOUTH EVERY NIGHT 90 Tab 1    amLODIPine (NORVASC) 10 mg tablet TAKE 1 TABLET BY MOUTH EVERY DAY 90 Tab 3    aspirin delayed-release 81 mg tablet Take 1 Tab by mouth daily. 90 Tab 3    losartan-hydroCHLOROthiazide (HYZAAR) 100-25 mg per tablet Take 1 Tab by mouth daily. 80 Tab 2     No Known Allergies  Past Medical History:   Diagnosis Date    Acute MI, inferoposterior wall, initial episode of care ([facility] [address] 4/22/2011    Arthritis     CAD (coronary artery disease) 4/23/2011    CAD (coronary artery disease), native coronary artery 4/22/2011    CAD (coronary artery disease), native coronary artery 4/22/2011    Chronic pain     CVA, old, speech/language deficit 4/12/2017    Encounter for immunization 1/25/2018    HTN, goal below 130/80 4/12/2017    Hypercholesterolemia 4/12/2017    Hypertension 4/22/2011    Stroke ([facility] [address]     Stroke ([facility] 5/28/2019    Tobacco abuse disorder 4/12/2017    [facility] ([facility] 4/22/2011     History reviewed. No pertinent surgical history. Family History   Problem Relation Age of Onset    Dementia Mother     Emphysema Father     Hypertension Father      Social History     Tobacco Use    Smoking status: Current Every Day Smoker     Packs/day: 1.00     Years: 35.00     Pack years: 35.00    Smokeless tobacco: Never Used    Tobacco comment: 2 packs of cigs last 1 month   Substance Use Topics    Alcohol use: No      Lab Results   Component Value Date/Time    WBC 7.4 11/28/2018 10:05 AM    HGB 14.8 11/28/2018 10:05 AM    HCT 43.2 11/28/2018 10:05 AM    PLATELET 805 51/93/1432 10:05 AM    MCV 91 11/28/2018 10:05 AM     Lab Results   Component Value Date/Time    TSH 1.050 11/28/2018 10:05 AM         Review of Systems   Constitutional: Negative for chills and fever. HENT: Negative for ear pain and nosebleeds. Eyes: Negative for blurred vision, pain and discharge. Respiratory: Negative for shortness of breath. Cardiovascular: Negative for chest pain and leg swelling. Gastrointestinal: Negative for constipation, diarrhea, nausea and vomiting. Genitourinary: Negative for frequency. Musculoskeletal: Negative for joint pain. Skin: Negative for itching and rash. Neurological: Negative for headaches. Psychiatric/Behavioral: Negative for depression. The patient is not nervous/anxious. Physical Exam   Constitutional: He is oriented to person, place, and time. He appears well-developed and well-nourished. HENT:   Head: Normocephalic and atraumatic. Mouth/Throat: No oropharyngeal exudate. Eyes: Conjunctivae and EOM are normal.   Neck: Normal range of motion. Neck supple. Cardiovascular: Normal rate, regular rhythm and normal heart sounds. No murmur heard. Pulmonary/Chest: Effort normal and breath sounds normal. No respiratory distress. Abdominal: Soft. Bowel sounds are normal. He exhibits no distension. There is no rebound. Musculoskeletal: He exhibits no edema or tenderness. Neurological: He is alert and oriented to person, place, and time. Skin: Skin is warm. No erythema. Psychiatric: He has a normal mood and affect. His behavior is normal.   Nursing note and vitals reviewed. ASSESSMENT and PLAN  Diagnoses and all orders for this visit:    1. Encounter for immunization  -     varicella-zoster recombinant, PF, (SHINGRIX) 50 mcg/0.5 mL susr injection; 0.5 mL by IntraMUSCular route once for 1 dose. 2. CVA, old, speech/language deficit  -     CBC W/O DIFF  -     METABOLIC PANEL, COMPREHENSIVE  -     LIPID PANEL  -     TSH 3RD GENERATION    3. Cerebrovascular accident (CVA) due to other mechanism ([facility] [address]  -     CBC W/O DIFF  -     METABOLIC PANEL, COMPREHENSIVE  -     LIPID PANEL  -     TSH 3RD GENERATION    4. Coronary artery disease involving native coronary artery with angina pectoris, unspecified whether native or transplanted heart (HCC)  -     CBC W/O DIFF  -     METABOLIC PANEL, COMPREHENSIVE  -     LIPID PANEL  -     TSH 3RD GENERATION    5.  Tobacco abuse disorder  -     CBC W/O DIFF  -     METABOLIC PANEL, COMPREHENSIVE  -     LIPID PANEL  -     TSH 3RD GENERATION    6. Hypercholesterolemia    Other orders  -     losartan-hydroCHLOROthiazide (HYZAAR) 100-25 mg per tablet; Take 1 Tab by mouth daily. -     rosuvastatin (CRESTOR) 20 mg tablet; TAKE 1 TABLET BY MOUTH EVERY NIGHT  -     amLODIPine (NORVASC) 10 mg tablet; TAKE 1 TABLET BY MOUTH EVERY DAY  -     aspirin delayed-release 81 mg tablet; Take 1 Tab by mouth daily. Discussed the patient's BMI with him. The BMI follow up plan is as follows:     dietary management education, guidance, and counseling  encourage exercise  monitor weight  prescribed dietary intake    An After Visit Summary was printed and given to the patient.

## 2019-07-29 ENCOUNTER — OFFICE VISIT (OUTPATIENT)
Dept: FAMILY MEDICINE CLINIC | Age: 64
End: 2019-07-29

## 2019-07-29 VITALS
HEART RATE: 69 BPM | BODY MASS INDEX: 27.41 KG/M2 | HEIGHT: 68 IN | DIASTOLIC BLOOD PRESSURE: 72 MMHG | OXYGEN SATURATION: 96 % | SYSTOLIC BLOOD PRESSURE: 137 MMHG | TEMPERATURE: 96.4 F | WEIGHT: 180.9 LBS | RESPIRATION RATE: 20 BRPM

## 2019-07-29 DIAGNOSIS — I63.89 CEREBROVASCULAR ACCIDENT (CVA) DUE TO OTHER MECHANISM (HCC): ICD-10-CM

## 2019-07-29 DIAGNOSIS — Z23 ENCOUNTER FOR IMMUNIZATION: ICD-10-CM

## 2019-07-29 DIAGNOSIS — Z13.31 SCREENING FOR DEPRESSION: ICD-10-CM

## 2019-07-29 DIAGNOSIS — Z87.891 PERSONAL HISTORY OF TOBACCO USE, PRESENTING HAZARDS TO HEALTH: ICD-10-CM

## 2019-07-29 DIAGNOSIS — Z12.5 SPECIAL SCREENING FOR MALIGNANT NEOPLASM OF PROSTATE: ICD-10-CM

## 2019-07-29 DIAGNOSIS — I10 HTN, GOAL BELOW 140/90: ICD-10-CM

## 2019-07-29 DIAGNOSIS — Z71.89 ADVANCED DIRECTIVES, COUNSELING/DISCUSSION: ICD-10-CM

## 2019-07-29 DIAGNOSIS — Z12.11 SCREEN FOR COLON CANCER: ICD-10-CM

## 2019-07-29 DIAGNOSIS — I69.328 CVA, OLD, SPEECH/LANGUAGE DEFICIT: ICD-10-CM

## 2019-07-29 DIAGNOSIS — Z13.39 SCREENING FOR ALCOHOLISM: ICD-10-CM

## 2019-07-29 DIAGNOSIS — Z00.00 MEDICARE ANNUAL WELLNESS VISIT, SUBSEQUENT: Primary | ICD-10-CM

## 2019-07-29 DIAGNOSIS — I69.359 CVA, OLD, HEMIPARESIS (HCC): ICD-10-CM

## 2019-07-29 NOTE — PROGRESS NOTES
This is the Subsequent Medicare Annual Wellness Exam, performed 12 months or more after the Initial AWV or the last Subsequent AWV    I have reviewed the patient's medical history in detail and updated the computerized patient record. History     Present for CPE, last Complete Physical exam was 2018 , not Up todate w/ all vaccination, last tetanus vaccine was in >5yrs ago, all offered but refused,  Last psa exam was normal and was in few yrs ago,          last colonoscopy was never secondary to the copay asked for >100$ not able to pay for it, No past surgical hx,  last bone dexa scan was never,   No family hx of breast cancer in male  no family hx of prostate cancer   no family hx of colon cancer, parent  of old age, ++sexaully active and uses Safe sex, + physically active,  compliant w/ meds, no Rf needed for today for his meds.    cva with rt sidede weatkness, + hx iof 2ppd sicne the age of 28yo, now over 1ppd,doesnot want to take meds for it, but also doesntowant to pay for it,   Patient present with history of coronary artery disease patient had one stent unfortunately he had to be restented secondary to stent blockage patient was compliant with his medications, currently on triple anticoagulated medication patient has not noticed any bleeding no skin bruisability stating that he has been feeling better less fatigue and less tiredness unfortunately has not gone back to work yet patient has been able to do 2 flights of steps not been sexually active stating that he has not used any nitroglycerin sublingually he has not noticed any muscle ache from high-dose statin therapy he has no dry cough not noticed any swelling patient has been stable since the second episode denies any complaint at this time      HTN  Today pt present for Bp check and++Compliancy w/ the bp meds, last refill was given losarhctz but it was discont, pt states that it dropped his bp currently on amlodipine 10mg daily,  having had the low salt diet ,  has been active, patien does not obtain the bp at home ,, today the pt denies Chest Pain, has no legs swelling no lightheadedness,        Past Medical History:   Diagnosis Date    Acute MI, inferoposterior wall, initial episode of care ([facility] 4/22/2011    Arthritis     CAD (coronary artery disease) 4/23/2011    CAD (coronary artery disease), native coronary artery 4/22/2011    CAD (coronary artery disease), native coronary artery 4/22/2011    Chronic pain     CVA, old, hemiparesis ([address] 5/28/2019    CVA, old, speech/language deficit 4/12/2017    Encounter for immunization 1/25/2018    HTN, goal below 130/80 4/12/2017    Hypercholesterolemia 4/12/2017    Hypertension 4/22/2011    Stroke ([facility]     [facility]    Tobacco abuse disorder 4/12/2017    Unstable angina ([facility] 4/22/2011      History reviewed. No pertinent surgical history. Current Outpatient Medications   Medication Sig Dispense Refill    rosuvastatin (CRESTOR) 20 mg tablet TAKE 1 TABLET BY MOUTH EVERY NIGHT 90 Tab 1    amLODIPine (NORVASC) 10 mg tablet TAKE 1 TABLET BY MOUTH EVERY DAY 90 Tab 3    aspirin delayed-release 81 mg tablet Take 1 Tab by mouth daily.  80 Tab 3     No Known Allergies  Family History   Problem Relation Age of Onset    Dementia Mother     Emphysema Father     Hypertension Father      Social History     Tobacco Use    Smoking status: Current Every Day Smoker     Packs/day: 1.00     Years: 35.00     Pack years: 35.00    Smokeless tobacco: Never Used    Tobacco comment: 2 packs of cigs last 1 month   Substance Use Topics    Alcohol use: No     Patient Active Problem List   Diagnosis Code    CVA, old, speech/language deficit I69.328    HTN, goal below 140/90 I10    Hypercholesterolemia E78.00    Tobacco abuse disorder Z72.0    Screening PSA (prostate specific antigen) Z12.5    Encounter for immunization Z23    Stroke ([facility] I63.9    CAD (coronary artery disease), native coronary artery I25.10    CVA, old, hemiparesis ([facility] [address] I69.359       Depression Risk Factor Screening:     3 most recent PHQ Screens 7/29/2019   Little interest or pleasure in doing things Not at all   Feeling down, depressed, irritable, or hopeless Not at all   Total Score PHQ 2 0     Alcohol Risk Factor Screening: You do not drink alcohol or very rarely. Functional Ability and Level of Safety:   Hearing Loss  Hearing is good. Activities of Daily Living  The home contains: handrails, grab bars and rugs  Patient needs help with:  transportation and walking    Fall Risk  No flowsheet data found. Abuse Screen  Patient is not abused    Cognitive Screening   Evaluation of Cognitive Function:  Has your family/caregiver stated any concerns about your memory: no  Normal    Patient Care Team   Patient Care Team:  Hailey Larsen MD as PCP - General (Family Practice)    Assessment/Plan   Education and counseling provided:  Are appropriate based on today's review and evaluation  End-of-Life planning (with patient's consent)  Pneumococcal Vaccine  Prostate cancer screening tests (PSA, covered annually)  Colorectal cancer screening tests    Diagnoses and all orders for this visit:    1. Medicare annual wellness visit, subsequent    2. Encounter for immunization  -     varicella-zoster recombinant, PF, (SHINGRIX) 50 mcg/0.5 mL susr injection; 0.5 mL by IntraMUSCular route once for 1 dose. -     ADMIN PNEUMOCOCCAL VACCINE  -     PNEUMOCOCCAL POLYSACCHARIDE VACCINE, 23-VALENT, ADULT OR IMMUNOSUPPRESSED PT DOSE,  -     TETANUS, DIPHTHERIA TOXOIDS AND ACELLULAR PERTUSSIS VACCINE (TDAP), IN INDIVIDS. >=7, IM    3. Advanced directives, counseling/discussion  -     ADVANCE CARE PLANNING FIRST 30 MINS  -     FULL CODE    4. Screening for alcoholism  -     OR ANNUAL ALCOHOL SCREEN 15 MIN    5. Screening for depression  -     DEPRESSION SCREEN ANNUAL    6.  Screen for colon cancer  -     COLOGUARD TEST (FECAL DNA COLORECTAL CANCER SCREENING)    7. Special screening for malignant neoplasm of prostate  -     PSA, DIAGNOSTIC (PROSTATE SPECIFIC AG)    8. Personal history of tobacco use, presenting hazards to health  -     [place] SMOKING AND TOBACCO USE CESSATION 3 - 10 MINUTES  -     CT LOW DOSE LUNG CANCER SCREENING; Future    9. HTN, goal below 140/90  -     CBC W/O DIFF  -     LIPID PANEL  -     TSH 3RD GENERATION  -     METABOLIC PANEL, COMPREHENSIVE  -     HEPATITIS C AB  -     CT LOW DOSE LUNG CANCER SCREENING; Future    10. CVA, old, speech/language deficit  -     CBC W/O DIFF  -     LIPID PANEL  -     TSH 3RD GENERATION  -     METABOLIC PANEL, COMPREHENSIVE  -     HEPATITIS C AB  -     CT LOW DOSE LUNG CANCER SCREENING; Future        [facility] education and counseling provided:  Age appropriate evidence-based preventive care recommendations based on today's review and evaluation; including relevant cancer screening guidelines, and vaccination recommendations. An After Visit Summary was printed and given to the patient with information about these guidelines, and a personalized schedule for health maintenance items. When appropriate and with patient agreement, orders noted below were placed to complete missing health maintenance items.       Health Maintenance Due   Topic Date Due    Pneumococcal 0-64 years (1 of 1 - PPSV23) 12/31/1961    Shingrix Vaccine Age 50> (1 of 2) 12/31/2007    MEDICARE YEARLY EXAM  07/26/2019    FOBT Q 1 YEAR AGE 50-75  08/02/2019

## 2019-07-29 NOTE — PROGRESS NOTES
Name and  verified      Chief Complaint   Patient presents with    Hypertension     follow up   [address] [place] [address] reviewed-discussed with patient. Patient stated stop taking Losartan-Hydrochlorothiazide due to dizziness .      1. Have you been to the ER, urgent care clinic since your last visit? Hospitalized since your last visit? no    2. Have you seen or consulted any other health care providers outside of the [address] [place] [address] since your last visit? Include any pap smears or colon screening.  no

## 2019-07-29 NOTE — PATIENT INSTRUCTIONS
Medicare Wellness Visit, Male    The best way to live healthy is to have a lifestyle where you eat a well-balanced diet, exercise regularly, limit alcohol use, and quit all forms of tobacco/nicotine, if applicable. Regular preventive services are another way to keep healthy. Preventive services (vaccines, screening tests, monitoring & exams) can help personalize your care plan, which helps you manage your own care. Screening tests can find health problems at the earliest stages, when they are easiest to treat. [facility] follows the current, evidence-based guidelines published by the [facility] ([facility]) when recommending preventive services for our patients. Because we follow these guidelines, sometimes recommendations change over time as research supports it. (For example, a prostate screening blood test is no longer routinely recommended for men with no symptoms.)  Of course, you and your doctor may decide to screen more often for some diseases, based on your risk and co-morbidities (chronic disease you are already diagnosed with). Preventive services for you include:  - Medicare offers their members a free annual wellness visit, which is time for you and your primary care provider to discuss and plan for your preventive service needs. Take advantage of this benefit every year!  -All adults over age 72 should receive the recommended pneumonia vaccines. Current USPSTF guidelines recommend a series of two vaccines for the best pneumonia protection.   -All adults should have a flu vaccine yearly and an ECG.  All adults age 61 and older should receive a shingles vaccine once in their lifetime.    -All adults age 38-68 who are overweight should have a diabetes screening test once every three years.   -Other screening tests & preventive services for persons with diabetes include: an eye exam to screen for diabetic retinopathy, a kidney function test, a foot exam, and stricter control over your cholesterol.   -Cardiovascular screening for adults with routine risk involves an electrocardiogram (ECG) at intervals determined by the provider.   -Colorectal cancer screening should be done for adults age 54-65 with no increased risk factors for colorectal cancer. There are a number of acceptable methods of screening for this type of cancer. Each test has its own benefits and drawbacks. Discuss with your provider what is most appropriate for you during your annual wellness visit. The different tests include: colonoscopy (considered the best screening method), a fecal occult blood test, a fecal DNA test, and sigmoidoscopy.  -All adults born between [facility] should be screened once for Hepatitis C.  -An Abdominal Aortic Aneurysm (AAA) Screening is recommended for men age 73-68 who has ever smoked in their lifetime. Here is a list of your current Health Maintenance items (your personalized list of preventive services) with a due date:  Health Maintenance Due   Topic Date Due    Pneumococcal Vaccine (1 of 1 - PPSV23) 12/31/1961    Shingles Vaccine (1 of 2) 12/31/2007    Annual Well Visit  07/26/2019    Stool testing for trace blood  08/02/2019          Learning About Living [place] D[facility]  What is a living will? A living will is a legal form you use to write down the kind of care you want at the end of your life. It is used by the health professionals who will treat you if you aren't able to decide for yourself. If you put your wishes in writing, your loved ones and others will know what kind of care you want. They won't need to guess. This can ease your mind and be helpful to others. A living will is not the same as an estate or property will. An estate will explains what you want to happen with your money and property after you die. Is a living will a legal document? A living will is a legal document. Each state has its own laws about living starks.  If you move to another state, make sure that your living will is legal in the state where you now live. Or you might use a universal form that has been approved by many states. This kind of form can sometimes be completed and stored online. Your electronic copy will then be available wherever you have a connection to the Internet. In most cases, doctors will respect your wishes even if you have a form from a different state. · You don't need an  to complete a living will. But legal advice can be helpful if your state's laws are unclear, your health history is complicated, or your family can't agree on what should be in your living will. · You can change your living will at any time. Some people find that their wishes about end-of-life care change as their health changes. · In addition to making a living will, think about completing a medical power of  form. This form lets you name the person you want to make end-of-life treatment decisions for you (your \"health care agent\") if you're not able to. Many hospitals and nursing homes will give you the forms you need to complete a living will and a medical power of . · Your living will is used only if you can't make or communicate decisions for yourself anymore. If you become able to make decisions again, you can accept or refuse any treatment, no matter what you wrote in your living will. · Your state may offer an online registry. This is a place where you can store your living will online so the doctors and nurses who need to treat you can find it right away. What should you think about when creating a living will? Talk about your end-of-life wishes with your family members and your doctor. Let them know what you want. That way the people making decisions for you won't be surprised by your choices. Think about these questions as you make your living will:  · Do you know enough about life support methods that might be used?  If not, talk to your doctor so you know what might be done if you can't breathe on your own, your heart stops, or you're unable to swallow. · What things would you still want to be able to do after you receive life-support methods? Would you want to be able to walk? To speak? To eat on your own? To live without the help of machines? · If you have a choice, where do you want to be cared for? In your home? At a hospital or nursing home? · Do you want certain Zoroastrianism practices performed if you become very ill? · If you have a choice at the end of your life, where would you prefer to die? At home? In a hospital or nursing home? Somewhere else? · Would you prefer to be buried or cremated? · Do you want your organs to be donated after you die? What should you do with your living will? · Make sure that your family members and your health care agent have copies of your living will. · Give your doctor a copy of your living will to keep in your medical record. If you have more than one doctor, make sure that each one has a copy. · You may want to put a copy of your living will where it can be easily found. Where can you learn more? Go to http://nereyda-micki.info/. Enter I539 in the search box to learn more about \"Learning About Living [facility]. \"  Current as of: August 8, 2016  Content Version: 11.3  © 6045-5399 [employer]. Care instructions adapted under license by [employer] (which disclaims liability or warranty for this information). If you have questions about a medical condition or this instruction, always ask your healthcare professional. [address] any warranty or liability for your use of this information. Preventing Falls: Care Instructions  Your Care Instructions    Getting around your home safely can be a challenge if you have injuries or health problems that make it easy for you to fall.  Loose rugs and furniture in walkways are among the dangers for many older people who have problems walking or who have poor eyesight. People who have conditions such as arthritis, osteoporosis, or dementia also have to be careful not to fall. You can make your home safer with a few simple measures. Follow-up care is a key part of your treatment and safety. Be sure to make and go to all appointments, and call your doctor if you are having problems. It's also a good idea to know your test results and keep a list of the medicines you take. How can you care for yourself at home? Taking care of yourself  · You may get dizzy if you do not drink enough water. To prevent dehydration, drink plenty of fluids, enough so that your urine is light yellow or clear like water. Choose water and other caffeine-free clear liquids. If you have kidney, heart, or liver disease and have to limit fluids, talk with your doctor before you increase the amount of fluids you drink. · Exercise regularly to improve your strength, muscle tone, and balance. Walk if you can. Swimming may be a good choice if you cannot walk easily. · Have your vision and hearing checked each year or any time you notice a change. If you have trouble seeing and hearing, you might not be able to avoid objects and could lose your balance. · Know the side effects of the medicines you take. Ask your doctor or pharmacist whether the medicines you take can affect your balance. Sleeping pills or sedatives can affect your balance. · Limit the amount of alcohol you drink. Alcohol can impair your balance and other senses. · Ask your doctor whether calluses or corns on your feet need to be removed. If you wear loose-fitting shoes because of calluses or corns, you can lose your balance and fall. · Talk to your doctor if you have numbness in your feet. Preventing falls at home  · Remove raised doorway thresholds, throw rugs, and clutter. Repair loose carpet or raised areas in the floor.   · Move furniture and electrical cords to keep them out of walking paths. · Use nonskid floor wax, and wipe up spills right away, especially on ceramic tile floors. · If you use a walker or cane, put rubber tips on it. If you use crutches, clean the bottoms of them regularly with an abrasive pad, such as steel wool. · Keep your house well lit, especially [place], and outside walkways. Use night-lights in areas such as hallways and bathrooms. Add extra light switches or use remote switches (such as switches that go on or off when you clap your hands) to make it easier to turn lights on if you have to get up during the night. · Install sturdy handrails on stairways. · Move items in your cabinets so that the things you use a lot are on the lower shelves (about waist level). · Keep a cordless phone and a flashlight with new batteries by your bed. If possible, put a phone in each of the main rooms of your house, or carry a cell phone in case you fall and cannot reach a phone. Or, you can wear a device around your neck or wrist. You push a button that sends a signal for help. · Wear low-heeled shoes that fit well and give your feet good support. Use footwear with nonskid soles. Check the heels and soles of your shoes for wear. Repair or replace worn heels or soles. · Do not wear socks without shoes on wood floors. · Walk on the grass when the sidewalks are slippery. If you live in an area that gets snow and ice in the winter, sprinkle salt on slippery steps and sidewalks. Preventing falls in the bath  · Install grab bars and nonskid mats inside and outside your shower or tub and near the toilet and sinks. · Use shower chairs and bath benches. · Use a hand-held shower head that will allow you to sit while showering.   · Get into a tub or shower by putting the weaker leg in first. Get out of a tub or shower with your strong side first.  · Repair loose toilet seats and consider installing a raised toilet seat to make getting on and off the toilet easier. · Keep your bathroom door unlocked while you are in the shower. Where can you learn more? Go to http://nereyda-micki.info/. Enter [id number] [id number] in the search box to learn more about \"Preventing Falls: Care Instructions. \"  Current as of: March 16, 2018  Content Version: 11.8  © 0251-6203 [employer]. Care instructions adapted under license by [employer] (which disclaims liability or warranty for this information). If you have questions about a medical condition or this instruction, always ask your healthcare professional. [place] [address] any warranty or liability for your use of this information.

## 2019-07-29 NOTE — ACP (ADVANCE CARE PLANNING)
Advance Care Planning        Other Legally Authorized Decision Maker would be unknown at this time( mother passed away in 2017) as SDM     For My patients who has currently great Decision Making Capacity:     Patient is on presence of no family member,, stated that the pt wants to be not DNR at this time,  The pt likes to be a full code individual,  The patient states that there is a lot of will to live,  Pt was given the form,   will sign and bring us a copy on the later date.

## 2019-07-30 LAB
ALBUMIN SERPL-MCNC: 4.1 G/DL (ref 3.6–4.8)
ALBUMIN/GLOB SERPL: 1.5 {RATIO} (ref 1.2–2.2)
ALP SERPL-CCNC: 68 IU/L (ref 39–117)
ALT SERPL-CCNC: 7 IU/L (ref 0–44)
AST SERPL-CCNC: 13 IU/L (ref 0–40)
BILIRUB SERPL-MCNC: 0.4 MG/DL (ref 0–1.2)
BUN SERPL-MCNC: 11 MG/DL (ref 8–27)
BUN/CREAT SERPL: 11 (ref 10–24)
CALCIUM SERPL-MCNC: 8.9 MG/DL (ref 8.6–10.2)
CHLORIDE SERPL-SCNC: 103 MMOL/L (ref 96–106)
CHOLEST SERPL-MCNC: 113 MG/DL (ref 100–199)
CO2 SERPL-SCNC: 26 MMOL/L (ref 20–29)
CREAT SERPL-MCNC: 1.04 MG/DL (ref 0.76–1.27)
ERYTHROCYTE [DISTWIDTH] IN BLOOD BY AUTOMATED COUNT: 13.7 % (ref 12.3–15.4)
GLOBULIN SER CALC-MCNC: 2.7 G/DL (ref 1.5–4.5)
GLUCOSE SERPL-MCNC: 100 MG/DL (ref 65–99)
HCT VFR BLD AUTO: 43.9 % (ref 37.5–51)
HCV AB S/CO SERPL IA: <0.1 S/CO RATIO (ref 0–0.9)
HDLC SERPL-MCNC: 32 MG/DL
HGB BLD-MCNC: 14.2 G/DL (ref 13–17.7)
LDLC SERPL CALC-MCNC: 57 MG/DL (ref 0–99)
MCH RBC QN AUTO: 30.1 PG (ref 26.6–33)
MCHC RBC AUTO-ENTMCNC: 32.3 G/DL (ref 31.5–35.7)
MCV RBC AUTO: 93 FL (ref 79–97)
PLATELET # BLD AUTO: 251 X10E3/UL (ref 150–450)
POTASSIUM SERPL-SCNC: 4.5 MMOL/L (ref 3.5–5.2)
PROT SERPL-MCNC: 6.8 G/DL (ref 6–8.5)
PSA SERPL-MCNC: 0.7 NG/ML (ref 0–4)
RBC # BLD AUTO: 4.72 X10E6/UL (ref 4.14–5.8)
SODIUM SERPL-SCNC: 143 MMOL/L (ref 134–144)
TRIGL SERPL-MCNC: 119 MG/DL (ref 0–149)
TSH SERPL DL<=0.005 MIU/L-ACNC: 1.38 UIU/ML (ref 0.45–4.5)
VLDLC SERPL CALC-MCNC: 24 MG/DL (ref 5–40)
WBC # BLD AUTO: 8.2 X10E3/UL (ref 3.4–10.8)

## 2019-09-24 PROBLEM — Z23 ENCOUNTER FOR IMMUNIZATION: Status: RESOLVED | Noted: 2018-01-25 | Resolved: 2019-09-24

## 2019-09-25 PROBLEM — Z12.5 SCREENING PSA (PROSTATE SPECIFIC ANTIGEN): Status: RESOLVED | Noted: 2017-04-12 | Resolved: 2019-09-25

## 2020-01-29 ENCOUNTER — OFFICE VISIT (OUTPATIENT)
Dept: FAMILY MEDICINE CLINIC | Age: 65
End: 2020-01-29

## 2020-01-29 VITALS
WEIGHT: 185 LBS | SYSTOLIC BLOOD PRESSURE: 139 MMHG | DIASTOLIC BLOOD PRESSURE: 69 MMHG | RESPIRATION RATE: 19 BRPM | HEIGHT: 68 IN | OXYGEN SATURATION: 98 % | TEMPERATURE: 97 F | BODY MASS INDEX: 28.04 KG/M2 | HEART RATE: 69 BPM

## 2020-01-29 DIAGNOSIS — Z72.0 TOBACCO ABUSE DISORDER: ICD-10-CM

## 2020-01-29 DIAGNOSIS — E78.00 HYPERCHOLESTEROLEMIA: ICD-10-CM

## 2020-01-29 DIAGNOSIS — I69.328 CVA, OLD, SPEECH/LANGUAGE DEFICIT: ICD-10-CM

## 2020-01-29 DIAGNOSIS — I10 HTN, GOAL BELOW 140/90: ICD-10-CM

## 2020-01-29 DIAGNOSIS — I25.119 CORONARY ARTERY DISEASE INVOLVING NATIVE CORONARY ARTERY WITH ANGINA PECTORIS, UNSPECIFIED WHETHER NATIVE OR TRANSPLANTED HEART (HCC): ICD-10-CM

## 2020-01-29 DIAGNOSIS — I63.89 CEREBROVASCULAR ACCIDENT (CVA) DUE TO OTHER MECHANISM (HCC): Primary | ICD-10-CM

## 2020-01-29 RX ORDER — ROSUVASTATIN CALCIUM 20 MG/1
TABLET, COATED ORAL
Qty: 90 TAB | Refills: 3 | Status: SHIPPED | OUTPATIENT
Start: 2020-01-29 | End: 2020-12-14 | Stop reason: SDUPTHER

## 2020-01-29 RX ORDER — ASPIRIN 81 MG/1
81 TABLET ORAL DAILY
Qty: 90 TAB | Refills: 3 | Status: SHIPPED | OUTPATIENT
Start: 2020-01-29

## 2020-01-29 RX ORDER — AMLODIPINE BESYLATE 10 MG/1
TABLET ORAL
Qty: 90 TAB | Refills: 3 | Status: SHIPPED | OUTPATIENT
Start: 2020-01-29 | End: 2020-05-29

## 2020-01-29 RX ORDER — VARENICLINE TARTRATE 25 MG
KIT ORAL
Qty: 1 DOSE PACK | Refills: 0 | Status: SHIPPED | OUTPATIENT
Start: 2020-01-29 | End: 2021-07-23

## 2020-01-29 NOTE — PATIENT INSTRUCTIONS
Body Mass Index: Care Instructions Your Care Instructions Body mass index (BMI) can help you see if your weight is raising your risk for health problems. It uses a formula to compare how much you weigh with how tall you are. · A BMI lower than 18.5 is considered underweight. · A BMI between 18.5 and 24.9 is considered healthy. · A BMI between 25 and 29.9 is considered overweight. A BMI of 30 or higher is considered obese. If your BMI is in the normal range, it means that you have a lower risk for weight-related health problems. If your BMI is in the overweight or obese range, you may be at increased risk for weight-related health problems, such as high blood pressure, heart disease, stroke, arthritis or joint pain, and diabetes. If your BMI is in the underweight range, you may be at increased risk for health problems such as fatigue, lower protection (immunity) against illness, muscle loss, bone loss, hair loss, and hormone problems. BMI is just one measure of your risk for weight-related health problems. You may be at higher risk for health problems if you are not active, you eat an unhealthy diet, or you drink too much alcohol or use tobacco products. Follow-up care is a key part of your treatment and safety. Be sure to make and go to all appointments, and call your doctor if you are having problems. It's also a good idea to know your test results and keep a list of the medicines you take. How can you care for yourself at home? · Practice healthy eating habits. This includes eating plenty of fruits, vegetables, whole grains, lean protein, and low-fat dairy. · If your doctor recommends it, get more exercise. Walking is a good choice. Bit by bit, increase the amount you walk every day. Try for at least 30 minutes on most days of the week. · Do not smoke. Smoking can increase your risk for health problems.  If you need help quitting, talk to your doctor about stop-smoking programs and medicines. These can increase your chances of quitting for good. · Limit alcohol to 2 drinks a day for men and 1 drink a day for women. Too much alcohol can cause health problems. If you have a BMI higher than 25 · Your doctor may do other tests to check your risk for weight-related health problems. This may include measuring the distance around your waist. A waist measurement of more than 40 inches in men or 35 inches in women can increase the risk of weight-related health problems. · Talk with your doctor about steps you can take to stay healthy or improve your health. You may need to make lifestyle changes to lose weight and stay healthy, such as changing your diet and getting regular exercise. If you have a BMI lower than 18.5 · Your doctor may do other tests to check your risk for health problems. · Talk with your doctor about steps you can take to stay healthy or improve your health. You may need to make lifestyle changes to gain or maintain weight and stay healthy, such as getting more healthy foods in your diet and doing exercises to build muscle. Where can you learn more? Go to http://nereyda-micki.info/. Enter S176 in the search box to learn more about \"Body Mass Index: Care Instructions. \" Current as of: October 13, 2016 Content Version: 11.4 © 4063-2613 [employer]wise[employer]. Care instructions adapted under license by [employer] (which disclaims liability or warranty for this information). If you have questions about a medical condition or this instruction, always ask your healthcare professional. [place] [address] any warranty or liability for your use of this information. Low Sodium Diet (2,000 Milligram): Care Instructions Your Care Instructions Too much sodium causes your body to hold on to extra water. This can raise your blood pressure and force your heart and kidneys to work harder.  In very serious cases, this could cause you to be put in the hospital. It might even be life-threatening. By limiting sodium, you will feel better and lower your risk of serious problems. The most common source of sodium is salt. People get most of the salt in their diet from canned, prepared, and packaged foods. Fast food and restaurant meals also are very high in sodium. Your doctor will probably limit your sodium to less than 2,000 milligrams (mg) a day. This limit counts all the sodium in prepared and packaged foods and any salt you add to your food. Follow-up care is a key part of your treatment and safety. Be sure to make and go to all appointments, and call your doctor if you are having problems. It's also a good idea to know your test results and keep a list of the medicines you take. How can you care for yourself at home? Read food labels · Read labels on cans and food packages. The labels tell you how much sodium is in each serving. Make sure that you look at the serving size. If you eat more than the serving size, you have eaten more sodium. · Food labels also tell you the Percent Daily Value for sodium. Choose products with low Percent Daily Values for sodium. · Be aware that sodium can come in forms other than salt, including monosodium glutamate (MSG), sodium citrate, and sodium bicarbonate (baking soda). MSG is often added to Asian food. When you eat out, you can sometimes ask for food without MSG or added salt. Buy low-sodium foods · Buy foods that are labeled \"unsalted\" (no salt added), \"sodium-free\" (less than 5 mg of sodium per serving), or \"low-sodium\" (less than 140 mg of sodium per serving). Foods labeled \"reduced-sodium\" and \"light sodium\" may still have too much sodium. Be sure to read the label to see how much sodium you are getting. · Buy fresh vegetables, or frozen vegetables without added sauces. Buy low-sodium versions of canned vegetables, soups, and other canned goods. Prepare low-sodium meals · Cut back on the amount of salt you use in cooking. This will help you adjust to the taste. Do not add salt after cooking. One teaspoon of salt has about 2,300 mg of sodium. · Take the salt shaker off the table. · Flavor your food with garlic, lemon juice, onion, vinegar, herbs, and spices. Do not use soy sauce, lite soy sauce, steak sauce, onion salt, garlic salt, celery salt, mustard, or ketchup on your food. · Use low-sodium salad dressings, sauces, and ketchup. Or make your own salad dressings and sauces without adding salt. · Use less salt (or none) when recipes call for it. You can often use half the salt a recipe calls for without losing flavor. Other foods such as rice, pasta, and grains do not need added salt. · Rinse canned vegetables, and cook them in fresh water. This removes somebut not allof the salt. · Avoid water that is naturally high in sodium or that has been treated with water softeners, which add sodium. Call your local water company to find out the sodium content of your water supply. If you buy bottled water, read the label and choose a sodium-free brand. Avoid high-sodium foods · Avoid eating: 
? Smoked, cured, salted, and canned meat, fish, and poultry. ? Ham, davila, hot dogs, and luncheon meats. ? Regular, hard, and processed cheese and regular peanut butter. ? Crackers with salted tops, and other salted snack foods such as pretzels, chips, and salted popcorn. ? Frozen prepared meals, unless labeled low-sodium. ? Canned and dried soups, broths, and bouillon, unless labeled sodium-free or low-sodium. ? Canned vegetables, unless labeled sodium-free or low-sodium. ? Western Sarahy fries, pizza, tacos, and other fast foods. ? Pickles, olives, ketchup, and other condiments, especially soy sauce, unless labeled sodium-free or low-sodium. Where can you learn more? Go to http://franny.info/. Enter K813 in the search box to learn more about \"Low Sodium Diet (2,000 Milligram): Care Instructions. \" Current as of: November 7, 2018 Content Version: 12.2 © 6855-6999 [employer]. Care instructions adapted under license by [employer] (which disclaims liability or warranty for this information). If you have questions about a medical condition or this instruction, always ask your healthcare professional. [place] [address] any warranty or liability for your use of this information. Stopping Smokeless Tobacco Use: Care Instructions Your Care Instructions Smokeless tobacco comes in many forms, such as snuff and chewing tobacco: · Snuff is finely ground tobacco sold in cans or pouches. Most of the time, snuff is used by putting a \"pinch\" or \"dip\" between the lower lip or cheek and the gum. · Chewing tobacco is sold as loose leaves, plugs, or twists. It is chewed or placed between the cheek and the gum or teeth. There are plenty of reasons to stop using smokeless tobacco. These products are harmful. They are not risk-free alternatives to smoking. Smokeless tobacco contains nicotine, which is addicting. Though using smokeless tobacco is less harmful than smoking cigarettes, it can cause serious health problems, such as: 
· White patches or red sores in your mouth that can turn into mouth cancer involving the lip, tongue, or cheek. · Tooth loss and other dental problems. · Gum disease. Your gums may pull away from your teeth and not grow back. People who use smokeless tobacco crave the nicotine in it. Giving up smokeless tobacco is much harder than simply changing a habit. Your body has to stop craving the nicotine. It is hard to quit, but you can do it. Many tools are available for people who want to quit using smokeless tobacco. You may find that combining tools works best for you. There are several steps to quitting. First you get ready to quit.  Then you get support to help you. After that, you learn new skills and behaviors to quit. For many people, a necessary step is getting and using medicine. Your doctor will help you set up the plan that best meets your needs. You may want to attend a tobacco cessation program. When you choose a program, look for one that has proven success. Ask your doctor for ideas. You will greatly increase your chances of success if you take medicine as well as get counseling or join a cessation program. 
Some of the changes you feel when you first quit smokeless tobacco are uncomfortable. Your body will miss the nicotine at first, and you may feel short-tempered and grumpy. You may have trouble sleeping or concentrating. Medicine can help you deal with these symptoms. You may struggle with changing your habits and rituals. The last step is the tricky one: Be prepared for the urge to use smokeless tobacco to continue for a time. This is a lot to deal with, but keep at it. You will feel better. Follow-up care is a key part of your treatment and safety. Be sure to make and go to all appointments, and call your doctor if you are having problems. It's also a good idea to know your test results and keep a list of the medicines you take. How can you care for yourself at home? · Ask your family, friends, and coworkers for support. You have a better chance of quitting if you have help and support. · Join a support group for people who are trying to quit using smokeless tobacco. 
· Set a quit date. Pick your date carefully so that it is not right in the middle of a big deadline or stressful time. After you quit, do not use smokeless tobacco even once. Get rid of all spit cups, cans, and pouches after your last use. Clean your house and your clothes so that they do not smell of tobacco. 
· Learn how to be a non-user.  Think about ways you can avoid those things that make you reach for tobacco. 
 ? Learn some ways to deal with cravings, like calling a friend or going for a walk. Cravings often pass. ? Avoid situations that put you at greatest risk for using smokeless tobacco. For some people, it is hard to spend time with friends without dipping or chewing. For others, they might skip a coffee break with coworkers who smoke or use smokeless tobacco. 
? Change your daily routine. Take a different route to work, or eat a meal in a different place. · Cut down on stress. Calm yourself or release tension by doing an activity you enjoy, such as reading a book, taking a hot bath, or gardening. · Talk to your doctor or pharmacist about nicotine replacement therapy. You still get nicotine, but you do not use tobacco. Nicotine replacement products help you slowly reduce the amount of nicotine you need. Many of these products are available over the counter. They include nicotine patches, gum, lozenges, and inhalers. · Ask your doctor about bupropion (Wellbutrin) or varenicline (Chantix), which are prescription medicines. They do not contain nicotine. They help you by reducing withdrawal symptoms, such as stress and anxiety. · Get regular exercise. Having healthy habits will help your body move past its craving for nicotine. · Be prepared to keep trying. Most people are not successful the first few times they try to quit. Do not get mad at yourself if you use tobacco again. Make a list of things you learned, and think about when you want to try again, such as next week, next month, or next year. Where can you learn more? Go to http://nereyda-micki.info/. Enter U164 in the search box to learn more about \"Stopping Smokeless Tobacco Use: Care Instructions. \" Current as of: September 26, 2018 Content Version: 12.2 © 5989-0776 [employer].  Care instructions adapted under license by [employer] (which disclaims liability or warranty for this information). If you have questions about a medical condition or this instruction, always ask your healthcare professional. [place] [address] any warranty or liability for your use of this information.

## 2020-01-29 NOTE — PROGRESS NOTES
Name and  verified        Chief Complaint   Patient presents with    Hypertension     follow up           Health Maintenance reviewed-discussed with patient. 1. Have you been to the ER, urgent care clinic since your last visit? Hospitalized since your last visit? no    2. Have you seen or consulted any other health care providers outside of the [address] [place] [address] since your last visit? Include any pap smears or colon screening.   no

## 2020-01-29 NOTE — PROGRESS NOTES
HISTORY OF PRESENT ILLNESS  Douglas Worthington is a 60 y.o. male. HPI   HTN  Today pt present for Bp check and++= Compliancy w/ the bp meds, having had the low salt diet ,  has been active, patien does not obtain the bp at home ,, today the pt denies Chest Pain, has no legs swelling no lightheadedness,  Unfortunately  with tobacco abuse stating that the pt is all stressed out, the tobacco use helps it, took meds for it, no s no h ideation, the pt has slowed down but not ready to quit at this time, used to do 2ppd but now only 1.5ppd, had stroke in 2010, no with rt sided hemiparesis,  unable to afford the medication the pt likes to quit it on its own, pt has been offered alternatives but states not yet,   HDL Cholesterol 32   Patient presents with history of cerebrovascular accident approximately 6-8 months ago patient is able to talk no blurry vision unfortunately the stroke did leave the patient with right-sided hemiparesis upper extremity worse than lower extremity on the right size currently patient walks with a cane and  does not have a good balance secondary to the right-sided weakness patient also has some muscle atrophy and right upper hand deformity patient has been compliant with prescribed medications, at this time patient denies any chest pain and is not dizzy unfortunately the right side become spasmic accordingly patient has no antispasmodic medication to be used      Current Outpatient Medications   Medication Sig Dispense Refill    rosuvastatin (CRESTOR) 20 mg tablet TAKE 1 TABLET BY MOUTH EVERY NIGHT 90 Tab 1    amLODIPine (NORVASC) 10 mg tablet TAKE 1 TABLET BY MOUTH EVERY DAY 90 Tab 3    aspirin delayed-release 81 mg tablet Take 1 Tab by mouth daily.  80 Tab 3     No Known Allergies  Past Medical History:   Diagnosis Date    Acute MI, inferoposterior wall, initial episode of care ([facility] [address] 4/22/2011    Arthritis     CAD (coronary artery disease) 4/23/2011    CAD (coronary artery disease), native coronary artery 4/22/2011    CAD (coronary artery disease), native coronary artery 4/22/2011    Chronic pain     CVA, old, hemiparesis ([facility] [address] 5/28/2019    CVA, old, speech/language deficit 4/12/2017    Encounter for immunization 1/25/2018    HTN, goal below 130/80 4/12/2017    Hypercholesterolemia 4/12/2017    Hypertension 4/22/2011    Stroke ([facility] [address]     Stroke ([facility] [address] 5/28/2019    Tobacco abuse disorder 4/12/2017    Unstable angina ([facility] [address] 4/22/2011     History reviewed. No pertinent surgical history. Family History   Problem Relation Age of Onset    Dementia Mother     Emphysema Father     Hypertension Father      Social History     Tobacco Use    Smoking status: Current Every Day Smoker     Packs/day: 1.00     Years: 35.00     Pack years: 35.00    Smokeless tobacco: Never Used    Tobacco comment: 2 packs of cigs last 1 month   Substance Use Topics    Alcohol use: No      Lab Results   Component Value Date/Time    Glucose 100 (H) 07/29/2019 10:40 AM    LDL, calculated 57 07/29/2019 10:40 AM    Creatinine 1.04 07/29/2019 10:40 AM      Lab Results   Component Value Date/Time    Cholesterol, total 113 07/29/2019 10:40 AM    HDL Cholesterol 32 (L) 07/29/2019 10:40 AM    LDL, calculated 57 07/29/2019 10:40 AM    Triglyceride 119 07/29/2019 10:40 AM        Review of Systems   Constitutional: Negative for chills, fever and malaise/fatigue. HENT: Negative for congestion and nosebleeds. Eyes: Negative for blurred vision and pain. Respiratory: Negative for shortness of breath and wheezing. Cardiovascular: Negative for chest pain, palpitations and leg swelling. Gastrointestinal: Negative for constipation, diarrhea, nausea and vomiting. Genitourinary: Negative for dysuria and frequency. Musculoskeletal: Negative for joint pain and myalgias. Skin: Negative for itching and rash. Neurological: Positive for speech change, focal weakness and weakness.  Negative for dizziness, loss of consciousness and headaches. Endo/Heme/Allergies: Does not bruise/bleed easily. Psychiatric/Behavioral: Negative for depression. The patient is not nervous/anxious and does not have insomnia. All other systems reviewed and are negative. Physical Exam  Vitals signs and nursing note reviewed. Constitutional:       Appearance: He is well-developed. HENT:      Head: Normocephalic and atraumatic. Mouth/Throat:      Pharynx: No oropharyngeal exudate. Eyes:      Conjunctiva/sclera: Conjunctivae normal.   Neck:      Musculoskeletal: Normal range of motion and neck supple. Cardiovascular:      Rate and Rhythm: Normal rate and regular rhythm. Heart sounds: Normal heart sounds. No murmur. Pulmonary:      Effort: Pulmonary effort is normal. No respiratory distress. Breath sounds: Normal breath sounds. Abdominal:      General: Bowel sounds are normal. There is no distension. Palpations: Abdomen is soft. Tenderness: There is no rebound. Musculoskeletal:         General: Deformity present. No tenderness. Skin:     General: Skin is warm. Findings: No erythema. Neurological:      Mental Status: He is alert and oriented to person, place, and time. Motor: Weakness present. Coordination: Coordination abnormal.      Gait: Gait abnormal.   Psychiatric:         Behavior: Behavior normal.         ASSESSMENT and PLAN  Diagnoses and all orders for this visit:    1. Cerebrovascular accident (CVA) due to other mechanism ([address]  -     amLODIPine (NORVASC) 10 mg tablet; TAKE 1 TABLET BY MOUTH EVERY DAY  -     aspirin delayed-release 81 mg tablet; Take 1 Tab by mouth daily. -     rosuvastatin (CRESTOR) 20 mg tablet; TAKE 1 TABLET BY MOUTH EVERY NIGHT    2. Tobacco abuse disorder  -     amLODIPine (NORVASC) 10 mg tablet; TAKE 1 TABLET BY MOUTH EVERY DAY  -     aspirin delayed-release 81 mg tablet; Take 1 Tab by mouth daily.   -     rosuvastatin (CRESTOR) 20 mg tablet; TAKE 1 TABLET BY MOUTH EVERY NIGHT    3. HTN, goal below 140/90  -     amLODIPine (NORVASC) 10 mg tablet; TAKE 1 TABLET BY MOUTH EVERY DAY  -     aspirin delayed-release 81 mg tablet; Take 1 Tab by mouth daily. -     rosuvastatin (CRESTOR) 20 mg tablet; TAKE 1 TABLET BY MOUTH EVERY NIGHT    4. Hypercholesterolemia  -     amLODIPine (NORVASC) 10 mg tablet; TAKE 1 TABLET BY MOUTH EVERY DAY  -     aspirin delayed-release 81 mg tablet; Take 1 Tab by mouth daily. -     rosuvastatin (CRESTOR) 20 mg tablet; TAKE 1 TABLET BY MOUTH EVERY NIGHT    5. CVA, old, speech/language deficit  -     amLODIPine (NORVASC) 10 mg tablet; TAKE 1 TABLET BY MOUTH EVERY DAY  -     aspirin delayed-release 81 mg tablet; Take 1 Tab by mouth daily. -     rosuvastatin (CRESTOR) 20 mg tablet; TAKE 1 TABLET BY MOUTH EVERY NIGHT    6. Coronary artery disease involving native coronary artery with angina pectoris, unspecified whether native or transplanted heart (HCC)  -     amLODIPine (NORVASC) 10 mg tablet; TAKE 1 TABLET BY MOUTH EVERY DAY  -     aspirin delayed-release 81 mg tablet; Take 1 Tab by mouth daily. -     rosuvastatin (CRESTOR) 20 mg tablet; TAKE 1 TABLET BY MOUTH EVERY NIGHT    Other orders  -     varenicline (CHANTIX STARTER АНДРЕЙ) 0.5 mg (11)- 1 mg (42) DsPk; As directed      Discussed the patient's BMI with him. The BMI follow up plan is as follows:     dietary management education, guidance, and counseling  encourage exercise  monitor weight  prescribed dietary intake    LDL target goal, the appropriate diet discussed. lifelong compliance to reduce risk is stressed. A regular exercise program,  maintain normal body weight, fitness,  to avoid fast food Advised, recheck for flp and lft in 3 months rtc fasting, meds side effect and compliance advised. An After Visit Summary was printed and given to the patient.

## 2020-05-27 DIAGNOSIS — Z72.0 TOBACCO ABUSE DISORDER: ICD-10-CM

## 2020-05-27 DIAGNOSIS — I63.89 CEREBROVASCULAR ACCIDENT (CVA) DUE TO OTHER MECHANISM (HCC): ICD-10-CM

## 2020-05-27 DIAGNOSIS — E78.00 HYPERCHOLESTEROLEMIA: ICD-10-CM

## 2020-05-27 DIAGNOSIS — I25.119 CORONARY ARTERY DISEASE INVOLVING NATIVE CORONARY ARTERY WITH ANGINA PECTORIS, UNSPECIFIED WHETHER NATIVE OR TRANSPLANTED HEART (HCC): ICD-10-CM

## 2020-05-27 DIAGNOSIS — I10 HTN, GOAL BELOW 140/90: ICD-10-CM

## 2020-05-27 DIAGNOSIS — I69.328 CVA, OLD, SPEECH/LANGUAGE DEFICIT: ICD-10-CM

## 2020-05-29 RX ORDER — AMLODIPINE BESYLATE 10 MG/1
TABLET ORAL
Qty: 90 TAB | Refills: 3 | Status: SHIPPED | OUTPATIENT
Start: 2020-05-29 | End: 2021-06-03

## 2020-07-29 ENCOUNTER — TELEPHONE (OUTPATIENT)
Dept: FAMILY MEDICINE CLINIC | Age: 65
End: 2020-07-29

## 2020-07-29 NOTE — TELEPHONE ENCOUNTER
Patient wants to have an in person visit with Dr. Salazar. Please call and schedule appt. [phone] Mr. Hilaria Lees

## 2020-10-06 ENCOUNTER — TELEPHONE (OUTPATIENT)
Dept: FAMILY MEDICINE CLINIC | Age: 65
End: 2020-10-06

## 2020-10-06 NOTE — TELEPHONE ENCOUNTER
He is confused about his appointment on 10/12/20 for a physical he said he received a call stating his physical is scheduled for this date and is suppose to be seen in office Mr. [place]  [phone]

## 2020-12-14 ENCOUNTER — OFFICE VISIT (OUTPATIENT)
Dept: FAMILY MEDICINE CLINIC | Age: 65
End: 2020-12-14
Payer: MEDICARE

## 2020-12-14 VITALS
OXYGEN SATURATION: 98 % | TEMPERATURE: 97.4 F | HEIGHT: 68 IN | HEART RATE: 64 BPM | DIASTOLIC BLOOD PRESSURE: 73 MMHG | BODY MASS INDEX: 25.88 KG/M2 | RESPIRATION RATE: 18 BRPM | WEIGHT: 170.8 LBS | SYSTOLIC BLOOD PRESSURE: 148 MMHG

## 2020-12-14 DIAGNOSIS — Z12.11 SCREEN FOR COLON CANCER: ICD-10-CM

## 2020-12-14 DIAGNOSIS — Z71.89 ADVANCED DIRECTIVES, COUNSELING/DISCUSSION: ICD-10-CM

## 2020-12-14 DIAGNOSIS — Z00.00 MEDICARE ANNUAL WELLNESS VISIT, SUBSEQUENT: ICD-10-CM

## 2020-12-14 DIAGNOSIS — Z72.0 TOBACCO ABUSE DISORDER: ICD-10-CM

## 2020-12-14 DIAGNOSIS — Z23 ENCOUNTER FOR IMMUNIZATION: ICD-10-CM

## 2020-12-14 DIAGNOSIS — I63.89 CEREBROVASCULAR ACCIDENT (CVA) DUE TO OTHER MECHANISM (HCC): ICD-10-CM

## 2020-12-14 DIAGNOSIS — I69.328 CVA, OLD, SPEECH/LANGUAGE DEFICIT: ICD-10-CM

## 2020-12-14 DIAGNOSIS — F17.210 NICOTINE DEPENDENCE, CIGARETTES, UNCOMPLICATED: ICD-10-CM

## 2020-12-14 DIAGNOSIS — Z13.31 SCREENING FOR DEPRESSION: ICD-10-CM

## 2020-12-14 DIAGNOSIS — I25.119 CORONARY ARTERY DISEASE INVOLVING NATIVE CORONARY ARTERY WITH ANGINA PECTORIS, UNSPECIFIED WHETHER NATIVE OR TRANSPLANTED HEART (HCC): ICD-10-CM

## 2020-12-14 DIAGNOSIS — Z87.891 PERSONAL HISTORY OF TOBACCO USE, PRESENTING HAZARDS TO HEALTH: ICD-10-CM

## 2020-12-14 DIAGNOSIS — E78.00 HYPERCHOLESTEROLEMIA: ICD-10-CM

## 2020-12-14 DIAGNOSIS — I10 HTN, GOAL BELOW 140/90: ICD-10-CM

## 2020-12-14 PROCEDURE — G0439 PPPS, SUBSEQ VISIT: HCPCS | Performed by: FAMILY MEDICINE

## 2020-12-14 PROCEDURE — 90000 DEPRESSION SCREEN ANNUAL: CPT | Performed by: FAMILY MEDICINE

## 2020-12-14 RX ORDER — ROSUVASTATIN CALCIUM 20 MG/1
TABLET, COATED ORAL
Qty: 90 TAB | Refills: 3 | Status: SHIPPED | OUTPATIENT
Start: 2020-12-14 | End: 2021-07-23

## 2020-12-14 NOTE — PROGRESS NOTES
Chief Complaint   Patient presents with    Complete Physical     1. Have you been to the ER, urgent care clinic since your last visit? Hospitalized since your last visit? No    2. Have you seen or consulted any other health care providers outside of the [facility] since your last visit? Include any pap smears or colon screening. No    Verified patient with two type of identifiers.  denies c/o  Declines flu vaccine

## 2020-12-14 NOTE — ACP (ADVANCE CARE PLANNING)
Advance Care Planning (ACP) Physician       Date of ACP Conversation: 4/24/2020    Conversation Conducted with:   Patient with Decision Making Capacity     Other Legally Authorized Decision Maker would be pending as SDM     For My patients who has currently great Decision Making Capacity:     Patient is on presence of no family member,, stated that the pt wants to be not DNR at this time,  The pt likes to be a full code individual,  The patient states that there is a lot of will to live,  Pt was given the form,   will sign and bring us a copy on the later date.       Conversation Outcomes / Follow-Up Plan:   Completed new Advance Directive      Length of ACP Conversation in minutes:  16 minutes

## 2020-12-14 NOTE — PATIENT INSTRUCTIONS
Medicare Wellness Visit, Male The best way to live healthy is to have a lifestyle where you eat a well-balanced diet, exercise regularly, limit alcohol use, and quit all forms of tobacco/nicotine, if applicable. Regular preventive services are another way to keep healthy. Preventive services (vaccines, screening tests, monitoring & exams) can help personalize your care plan, which helps you manage your own care. Screening tests can find health problems at the earliest stages, when they are easiest to treat. [facility] follows the current, evidence-based guidelines published by the [facility] Yousuf ([facility]) when recommending preventive services for our patients. Because we follow these guidelines, sometimes recommendations change over time as research supports it. (For example, a prostate screening blood test is no longer routinely recommended for men with no symptoms). Of course, you and your doctor may decide to screen more often for some diseases, based on your risk and co-morbidities (chronic disease you are already diagnosed with). Preventive services for you include: - Medicare offers their members a free annual wellness visit, which is time for you and your primary care provider to discuss and plan for your preventive service needs. Take advantage of this benefit every year! 
-All adults over age 72 should receive the recommended pneumonia vaccines. Current USPSTF guidelines recommend a series of two vaccines for the best pneumonia protection.  
-All adults should have a flu vaccine yearly and tetanus vaccine every 10 years. 
-All adults age 48 and older should receive the shingles vaccines (series of two vaccines).       
-All adults age 38-68 who are overweight should have a diabetes screening test once every three years.  
-Other screening tests & preventive services for persons with diabetes include: an eye exam to screen for diabetic retinopathy, a kidney function test, a foot exam, and stricter control over your cholesterol.  
-Cardiovascular screening for adults with routine risk involves an electrocardiogram (ECG) at intervals determined by the provider.  
-Colorectal cancer screening should be done for adults age 54-65 with no increased risk factors for colorectal cancer. There are a number of acceptable methods of screening for this type of cancer. Each test has its own benefits and drawbacks. Discuss with your provider what is most appropriate for you during your annual wellness visit. The different tests include: colonoscopy (considered the best screening method), a fecal occult blood test, a fecal DNA test, and sigmoidoscopy. 
-All adults born [facility] should be screened once for Hepatitis C. 
-An Abdominal Aortic Aneurysm (AAA) Screening is recommended for men age 73-68 who has ever smoked in their lifetime. Here is a list of your current Health Maintenance items (your personalized list of preventive services) with a due date: 
Health Maintenance Due Topic Date Due  Pneumococcal Vaccine (1 of 1 - PPSV23) [birth year]/31/1961  Shingles Vaccine (1 of 2) 12/31/2007  Colorectal Screening  08/02/2019 [address] [place] [address] Annual Well Visit  07/29/2020  Cholesterol Test   07/29/2020 Prostate Cancer Screening: Care Instructions Your Care Instructions Prostate cancer is the abnormal growth of cells in the prostate gland. This is an organ found just below a man's bladder. Screening can help find prostate cancer early. When it is found and treated early, the cancer may be cured. But it's not always treated. That's because the treatments can cause serious side effects. For most men, prostate cancer won't shorten their lives, especially if they are older and the cancer is growing slowly. Prostate cancer is the second most common type of cancer in men.  Most cases occur in men older than 72. The disease runs in families. And it's more common in -American men. Follow-up care is a key part of your treatment and safety. Be sure to make and go to all appointments, and call your doctor if you are having problems. It's also a good idea to know your test results and keep a list of the medicines you take. What is the screening test for prostate cancer? The main screening test for prostate cancer is the prostate-specific antigen (PSA) test. This is a blood test that measures how much PSA is in your blood. A high level may mean that you have an enlarged prostate, an infection, or cancer. Along with the PSA test, you may have a digital (finger) rectal exam. This exam checks for anything abnormal in your prostate. To do the exam, the doctor puts a lubricated, gloved finger into your rectum. If these tests suggest cancer, you may need a prostate biopsy. How is prostate cancer diagnosed? In a biopsy, the doctor takes small tissue samples from your prostate gland. Another doctor then looks at the tissue under a microscope to see if there are cancer cells, signs of infection, or other problems. The results help diagnose prostate cancer. What are the pros and cons of screening? Neither a PSA test nor a digital rectal exam can tell you for sure that you do or do not have cancer. But they can help you decide if you need more tests, such as a prostate biopsy. Screening tests may be useful because most men with prostate cancer don't have symptoms. It can be hard to know if you have cancer until it is more advanced. And then it's harder to treat. But having a PSA test can also cause harm. The test may show high levels of PSA that aren't caused by cancer. So you could have a prostate biopsy you didn't need. Or the PSA test might be normal when there is cancer, so a cancer might not be found early.  The test can also find cancers that would never have caused a problem during your lifetime. So you might have treatment that was not needed. Prostate cancer usually develops late in life and grows slowly. For many men, it does not shorten their lives. Some experts advise screening only for men who are at high risk. Talk with your doctor to see if screening is right for you. Where can you learn more? Go to http://www.gray.com/ Enter R550 in the search box to learn more about \"Prostate Cancer Screening: Care Instructions. \" Current as of: April 29, 2020               Content Version: 12.6 © 5786-8941 [employer]. Care instructions adapted under license by "[employer]" (which disclaims liability or warranty for this information). If you have questions about a medical condition or this instruction, always ask your healthcare professional. [address] any warranty or liability for your use of this information. Colon Cancer Screening: Care Instructions Your Care Instructions Colorectal cancer occurs in the colon or rectum. That's the lower part of your digestive system. It is the second-leading cause of cancer deaths in the [place]. It often starts with small growths called polyps in the colon or rectum. Polyps are usually found with screening tests. Depending on the type of test, any polyps found may be removed during the tests. Colorectal cancer usually does not cause symptoms at first. But regular tests can help find it early, before it spreads and becomes harder to treat. Experts advise routine tests for colon cancer for people starting at age 48. And they advise people with a higher risk of colon cancer to get tested sooner. Talk with your doctor about when you should start testing. Discuss which tests you need. Follow-up care is a key part of your treatment and safety.  Be sure to make and go to all appointments, and call your doctor if you are having problems. It's also a good idea to know your test results and keep a list of the medicines you take. What are the main screening tests for colon cancer? · Stool tests. These include the fecal immunochemical test (FIT) and the fecal occult blood test (FOBT). These tests check stool samples for signs of cancer. If your test is positive, you will need to have a colonoscopy. · Sigmoidoscopy. This test lets your doctor look at the lining of your rectum and the lowest part of your colon. Your doctor uses a lighted tube called a sigmoidoscope. This test can't find cancers or polyps in the upper part of your colon. In some cases, polyps that are found can be removed. But if your doctor finds polyps, you will need to have a colonoscopy to check the upper part of your colon. · Colonoscopy. This test lets your doctor look at the lining of your rectum and your entire colon. The doctor uses a thin, flexible tool called a colonoscope. It can also be used to remove polyps or get a tissue sample (biopsy). What tests do you need? The following guidelines are for people age 48 and over who are not at high risk for colorectal cancer. You may have at least one of these tests as directed by your doctor. · Fecal immunochemical test (FIT) or fecal occult blood test (FOBT) every year · Sigmoidoscopy every 5 years · Colonoscopy every 10 years If you are age 68 to 80, you can work with your doctor to decide if screening is a good option. If you are age 80 or older, your doctor will likely advise that screening is not helpful. Talk with your doctor about when you need to be tested. And discuss which tests are right for you. Your doctor may recommend earlier or more frequent testing if you: 
· Have had colorectal cancer before. · Have had colon polyps. · Have symptoms of colorectal cancer. These include blood in your stool and changes in your bowel habits.  
· Have a parent, brother or sister, or child with colon polyps or colorectal cancer. · Have a bowel disease. This includes ulcerative colitis and Crohn's disease. · Have a rare polyp syndrome that runs in families, such as familial adenomatous polyposis (FAP). · Have had radiation treatments to the belly or pelvis. When should you call for help? Watch closely for changes in your health, and be sure to contact your doctor if: 
  · You have any changes in your bowel habits.  
  · You have any problems. Where can you learn more? Go to http://www.gray.com/. Enter M541 in the search box to learn more about \"Colon Cancer Screening: Care Instructions. \" Current as of: March 28, 2018 Content Version: 11.8 © 4645-8803 [employer]. Care instructions adapted under license by [employer] (which disclaims liability or warranty for this information). If you have questions about a medical condition or this instruction, always ask your healthcare professional. [place] [address] any warranty or liability for your use of this information. Noni Sanchez [address] What is a living will? A living will is a legal form you use to write down the kind of care you want at the end of your life. It is used by the health professionals who will treat you if you aren't able to decide for yourself. If you put your wishes in writing, your loved ones and others will know what kind of care you want. They won't need to guess. This can ease your mind and be helpful to others. A living will is not the same as an estate or property will. An estate will explains what you want to happen with your money and property after you die. Is a living will a legal document? A living will is a legal document. Each state has its own laws about living starks. If you move to another state, make sure that your living will is legal in the state where you now live.  Or you might use a universal form that has been approved by many states. This kind of form can sometimes be completed and stored online. Your electronic copy will then be available wherever you have a connection to the Internet. In most cases, doctors will respect your wishes even if you have a form from a different state. · You don't need an  to complete a living will. But legal advice can be helpful if your state's laws are unclear, your health history is complicated, or your family can't agree on what should be in your living will. · You can change your living will at any time. Some people find that their wishes about end-of-life care change as their health changes. · In addition to making a living will, think about completing a medical power of  form. This form lets you name the person you want to make end-of-life treatment decisions for you (your \"health care agent\") if you're not able to. Many hospitals and nursing homes will give you the forms you need to complete a living will and a medical power of . · Your living will is used only if you can't make or communicate decisions for yourself anymore. If you become able to make decisions again, you can accept or refuse any treatment, no matter what you wrote in your living will. · Your state may offer an online registry. This is a place where you can store your living will online so the doctors and nurses who need to treat you can find it right away. What should you think about when creating a living will? Talk about your end-of-life wishes with your family members and your doctor. Let them know what you want. That way the people making decisions for you won't be surprised by your choices. Think about these questions as you make your living will: · Do you know enough about life support methods that might be used?  If not, talk to your doctor so you know what might be done if you can't breathe on your own, your heart stops, or you're unable to swallow. · What things would you still want to be able to do after you receive life-support methods? Would you want to be able to walk? To speak? To eat on your own? To live without the help of machines? · If you have a choice, where do you want to be cared for? In your home? At a hospital or nursing home? · Do you want certain Buddhism practices performed if you become very ill? · If you have a choice at the end of your life, where would you prefer to die? At home? In a hospital or nursing home? Somewhere else? · Would you prefer to be buried or cremated? · Do you want your organs to be donated after you die? What should you do with your living will? · Make sure that your family members and your health care agent have copies of your living will. · Give your doctor a copy of your living will to keep in your medical record. If you have more than one doctor, make sure that each one has a copy. · You may want to put a copy of your living will where it can be easily found. Where can you learn more? Go to http://www.gray.com/. Enter I318 in the search box to learn more about \"Learning About [facility]. \" Current as of: August 8, 2016 Content Version: 11.3 © 4994-4594 [employer]. Care instructions adapted under license by [employer] (which disclaims liability or warranty for this information). If you have questions about a medical condition or this instruction, always ask your healthcare professional. [place] [address] any warranty or liability for your use of this information. Preventing Falls: Care Instructions Your Care Instructions Getting around your home safely can be a challenge if you have injuries or health problems that make it easy for you to fall.  Loose rugs and furniture in walkways are among the dangers for many older people who have problems walking or who have poor eyesight. People who have conditions such as arthritis, osteoporosis, or dementia also have to be careful not to fall. You can make your home safer with a few simple measures. Follow-up care is a key part of your treatment and safety. Be sure to make and go to all appointments, and call your doctor if you are having problems. It's also a good idea to know your test results and keep a list of the medicines you take. How can you care for yourself at home? Taking care of yourself · You may get dizzy if you do not drink enough water. To prevent dehydration, drink plenty of fluids, enough so that your urine is light yellow or clear like water. Choose water and other caffeine-free clear liquids. If you have kidney, heart, or liver disease and have to limit fluids, talk with your doctor before you increase the amount of fluids you drink. · Exercise regularly to improve your strength, muscle tone, and balance. Walk if you can. Swimming may be a good choice if you cannot walk easily. · Have your vision and hearing checked each year or any time you notice a change. If you have trouble seeing and hearing, you might not be able to avoid objects and could lose your balance. · Know the side effects of the medicines you take. Ask your doctor or pharmacist whether the medicines you take can affect your balance. Sleeping pills or sedatives can affect your balance. · Limit the amount of alcohol you drink. Alcohol can impair your balance and other senses. · Ask your doctor whether calluses or corns on your feet need to be removed. If you wear loose-fitting shoes because of calluses or corns, you can lose your balance and fall. · Talk to your doctor if you have numbness in your feet. Preventing falls at home · Remove raised doorway thresholds, throw rugs, and clutter. Repair loose carpet or raised areas in the floor. · Move furniture and electrical cords to keep them out of walking paths. · Use nonskid floor wax, and wipe up spills right away, especially on ceramic tile floors. · If you use a walker or cane, put rubber tips on it. If you use crutches, clean the bottoms of them regularly with an abrasive pad, such as steel wool. · Keep your house well lit, especially [place], and outside walkways. Use night-lights in areas such as hallways and bathrooms. Add extra light switches or use remote switches (such as switches that go on or off when you clap your hands) to make it easier to turn lights on if you have to get up during the night. · Install sturdy handrails on stairways. · Move items in your cabinets so that the things you use a lot are on the lower shelves (about waist level). · Keep a cordless phone and a flashlight with new batteries by your bed. If possible, put a phone in each of the main rooms of your house, or carry a cell phone in case you fall and cannot reach a phone. Or, you can wear a device around your neck or wrist. You push a button that sends a signal for help. · Wear low-heeled shoes that fit well and give your feet good support. Use footwear with nonskid soles. Check the heels and soles of your shoes for wear. Repair or replace worn heels or soles. · Do not wear socks without shoes on wood floors. · Walk on the grass when the sidewalks are slippery. If you live in an area that gets snow and ice in the winter, sprinkle salt on slippery steps and sidewalks. Preventing falls in the bath · Install grab bars and nonskid mats inside and outside your shower or tub and near the toilet and sinks. · Use shower chairs and bath benches. · Use a hand-held shower head that will allow you to sit while showering.  
· Get into a tub or shower by putting the weaker leg in first. Get out of a tub or shower with your strong side first. 
 · Repair loose toilet seats and consider installing a raised toilet seat to make getting on and off the toilet easier. · Keep your bathroom door unlocked while you are in the shower. Where can you learn more? Go to http://www.miller.com/. Enter [id number] [id number] in the search box to learn more about \"Preventing Falls: Care Instructions. \" Current as of: March 16, 2018 Content Version: 11.8 © 7551-7062 [employer]. Care instructions adapted under license by [employer] (which disclaims liability or warranty for this information). If you have questions about a medical condition or this instruction, always ask your healthcare professional. [address] any warranty or liability for your use of this information.

## 2020-12-14 NOTE — PROGRESS NOTES
This is the Subsequent Medicare Annual Wellness Exam, performed 12 months or more after the Initial AWV or the last Subsequent AWV    I have reviewed the patient's medical history in detail and updated the computerized patient record. last colonoscopy was never secondary to the copay asked for >100$ not able to pay for it, No past surgical hx,  last bone dexa scan was never,   No family hx of breast cancer in male  no family hx of prostate cancer   no family hx of colon cancer, parent  of old age, ++sexaully active and uses Safe sex, + physically active,          Depression Risk Factor Screening:     3 most recent PHQ Screens 2020   Little interest or pleasure in doing things Not at all   Feeling down, depressed, irritable, or hopeless Not at all   Total Score PHQ 2 0       Alcohol Risk Screen   Do you average more than 2 drinks per night or 14 drinks a week: No    On any one occasion in the past three months have you have had more than 4 drinks containing alcohol:  No        Functional Ability and Level of Safety:   Hearing: Hearing is good. Activities of Daily Living: The home contains: handrails, grab bars and rugs  Patient does total self care     Ambulation: with difficulty, uses a cane     Fall Risk:  Fall Risk Assessment, last 12 mths 2020   Able to walk? Yes   Fall in past 12 months?  No     Abuse Screen:  Patient is not abused       Cognitive Screening   Has your family/caregiver stated any concerns about your memory: no     Cognitive Screening: Normal - MMSE (Mini Mental Status Exam)    Assessment/Plan   Education and counseling provided:  Are appropriate based on today's review and evaluation  End-of-Life planning (with patient's consent)  Pneumococcal Vaccine  Influenza Vaccine  Prostate cancer screening tests (PSA, covered annually)  Colorectal cancer screening tests  Diabetes outpatient self-management training services    Diagnoses and all orders for this visit:    1. CVA, old, speech/language deficit  -     rosuvastatin (CRESTOR) 20 mg tablet; TAKE 1 TABLET BY MOUTH EVERY NIGHT    2. Cerebrovascular accident (CVA) due to other mechanism ([address]  -     rosuvastatin (CRESTOR) 20 mg tablet; TAKE 1 TABLET BY MOUTH EVERY NIGHT    3. Coronary artery disease involving native coronary artery with angina pectoris, unspecified whether native or transplanted heart (HCC)  -     rosuvastatin (CRESTOR) 20 mg tablet; TAKE 1 TABLET BY MOUTH EVERY NIGHT    4. Tobacco abuse disorder  -     rosuvastatin (CRESTOR) 20 mg tablet; TAKE 1 TABLET BY MOUTH EVERY NIGHT    5. Hypercholesterolemia  -     rosuvastatin (CRESTOR) 20 mg tablet; TAKE 1 TABLET BY MOUTH EVERY NIGHT    6. HTN, goal below 140/90  -     rosuvastatin (CRESTOR) 20 mg tablet; TAKE 1 TABLET BY MOUTH EVERY NIGHT    7. Medicare annual wellness visit, subsequent    8. Advanced directives, counseling/discussion  -     FULL CODE    9. Screening for depression  -     DEPRESSION SCREEN ANNUAL    10. Screen for colon cancer  -     REFERRAL FOR COLONOSCOPY    11. Personal history of tobacco use, presenting hazards to health  -     CT LOW DOSE LUNG CANCER SCREENING; Future    12. Nicotine dependence, cigarettes, uncomplicated  -     CT LOW DOSE LUNG CANCER SCREENING; Future    13.  Encounter for immunization  -     ADMIN INFLUENZA VIRUS VAC  -     INFLUENZA VIRUS VAC QUAD,SPLIT,PRESV FREE SYRINGE IM        Health Maintenance Due     Health Maintenance Due   Topic Date Due    Pneumococcal 0-64 years (1 of 1 - PPSV23) 12/31/1963    Shingrix Vaccine Age 50> (1 of 2) 12/31/2007    Colorectal Cancer Screening Combo  08/02/2019    Medicare Yearly Exam  07/29/2020    Lipid Screen  07/29/2020       Patient Care Team   Patient Care Team:  Missy Elias MD as PCP - General (Family Medicine)  Missy Elias MD as PCP - [facility] Empaneled Provider    History         Patient able to drive no recent accident, Patient compare self health the same to others with the same age,   patient with normal hearing normal vision able to do all ADL currently working full-time, having had no fall and no incontinence having normal appetite no weight loss since last seen eating fruits and vegetables every day does not do exercises denies any substance abuse,   Pt opting on all preventive measures at this tme    Patient Active Problem List   Diagnosis Code    CVA, old, speech/language deficit I70.200    HTN, goal below 140/90 I10    Hypercholesterolemia E78.00    Tobacco abuse disorder Z72.0    Stroke ([facility] I63.9    CVA, old, hemiparesis ([address] I69.359     Past Medical History:   Diagnosis Date    Acute MI, inferoposterior wall, initial episode of care ([facility] 4/22/2011    Arthritis     CAD (coronary artery disease) 4/23/2011    CAD (coronary artery disease), native coronary artery 4/22/2011    CAD (coronary artery disease), native coronary artery 4/22/2011    Chronic pain     CVA, old, hemiparesis ([facility] 5/28/2019    CVA, old, speech/language deficit 4/12/2017    Encounter for immunization 1/25/2018    HTN, goal below 130/80 4/12/2017    Hypercholesterolemia 4/12/2017    Hypertension 4/22/2011    Stroke ([facility]     Stroke ([facility] 5/28/2019    Tobacco abuse disorder 4/12/2017    Unstable angina ([address] 4/22/2011      No past surgical history on file. Current Outpatient Medications   Medication Sig Dispense Refill    amLODIPine (NORVASC) 10 mg tablet TAKE 1 TABLET BY MOUTH EVERY DAY 90 Tab 3    aspirin delayed-release 81 mg tablet Take 1 Tab by mouth daily.  90 Tab 3    rosuvastatin (CRESTOR) 20 mg tablet TAKE 1 TABLET BY MOUTH EVERY NIGHT 90 Tab 3    varenicline (CHANTIX STARTER АНДРЕЙ) 0.5 mg (11)- 1 mg (42) DsPk As directed (Patient not taking: Reported on 12/14/2020) 1 Dose Pack 0     No Known Allergies    Family History   Problem Relation Age of Onset    Dementia Mother     Emphysema Father     Hypertension Father      Social History     Tobacco Use    Smoking status: Current Every Day Smoker     Packs/day: 1.00     Years: 35.00     Pack years: 35.00    Smokeless tobacco: Never Used    Tobacco comment: 2 packs of cigs last 1 month   Substance Use Topics    Alcohol use: No     Discussed with patient current guidelines for screening for lung cancer. Current recommendations are to obtain yearly screening LDCT yearly for age 46-80, or until smoke free for 15 years. Discussed with patient importance of compliance with yearly annual lung cancer screenings and willingness to undergo diagnosis and treatment if screening scan is positive. In addition, patient was counseled regarding (remaining smoke free/total smoking cessation).

## 2021-06-03 DIAGNOSIS — Z72.0 TOBACCO ABUSE DISORDER: ICD-10-CM

## 2021-06-03 DIAGNOSIS — I25.119 CORONARY ARTERY DISEASE INVOLVING NATIVE CORONARY ARTERY WITH ANGINA PECTORIS, UNSPECIFIED WHETHER NATIVE OR TRANSPLANTED HEART (HCC): ICD-10-CM

## 2021-06-03 DIAGNOSIS — I63.89 CEREBROVASCULAR ACCIDENT (CVA) DUE TO OTHER MECHANISM (HCC): ICD-10-CM

## 2021-06-03 DIAGNOSIS — E78.00 HYPERCHOLESTEROLEMIA: ICD-10-CM

## 2021-06-03 DIAGNOSIS — I10 HTN, GOAL BELOW 140/90: ICD-10-CM

## 2021-06-03 DIAGNOSIS — I69.328 CVA, OLD, SPEECH/LANGUAGE DEFICIT: ICD-10-CM

## 2021-06-03 RX ORDER — AMLODIPINE BESYLATE 10 MG/1
TABLET ORAL
Qty: 90 TABLET | Refills: 3 | Status: SHIPPED | OUTPATIENT
Start: 2021-06-03 | End: 2021-06-14

## 2021-06-14 ENCOUNTER — OFFICE VISIT (OUTPATIENT)
Dept: FAMILY MEDICINE CLINIC | Age: 66
End: 2021-06-14
Payer: MEDICARE

## 2021-06-14 VITALS
WEIGHT: 169.4 LBS | DIASTOLIC BLOOD PRESSURE: 75 MMHG | RESPIRATION RATE: 16 BRPM | HEIGHT: 68 IN | SYSTOLIC BLOOD PRESSURE: 110 MMHG | BODY MASS INDEX: 25.67 KG/M2 | HEART RATE: 65 BPM

## 2021-06-14 DIAGNOSIS — R94.8 ABNORMAL RESULTS OF FUNCTION STUDIES OF OTHER ORGANS AND SYSTEMS: ICD-10-CM

## 2021-06-14 DIAGNOSIS — I69.328 CVA, OLD, SPEECH/LANGUAGE DEFICIT: ICD-10-CM

## 2021-06-14 DIAGNOSIS — I63.89 CEREBROVASCULAR ACCIDENT (CVA) DUE TO OTHER MECHANISM (HCC): Primary | ICD-10-CM

## 2021-06-14 DIAGNOSIS — I10 HTN, GOAL BELOW 140/90: ICD-10-CM

## 2021-06-14 DIAGNOSIS — I69.359 CVA, OLD, HEMIPARESIS (HCC): ICD-10-CM

## 2021-06-14 DIAGNOSIS — Z12.11 SCREENING FOR COLON CANCER: ICD-10-CM

## 2021-06-14 DIAGNOSIS — R00.0 TACHYCARDIA: ICD-10-CM

## 2021-06-14 DIAGNOSIS — Z72.0 TOBACCO ABUSE DISORDER: ICD-10-CM

## 2021-06-14 DIAGNOSIS — E78.00 HYPERCHOLESTEROLEMIA: ICD-10-CM

## 2021-06-14 PROCEDURE — G8754 DIAS BP LESS 90: HCPCS | Performed by: FAMILY MEDICINE

## 2021-06-14 PROCEDURE — 99213 OFFICE O/P EST LOW 20 MIN: CPT | Performed by: FAMILY MEDICINE

## 2021-06-14 PROCEDURE — G8752 SYS BP LESS 140: HCPCS | Performed by: FAMILY MEDICINE

## 2021-06-14 PROCEDURE — G8510 SCR DEP NEG, NO PLAN REQD: HCPCS | Performed by: FAMILY MEDICINE

## 2021-06-14 PROCEDURE — G8427 DOCREV CUR MEDS BY ELIG CLIN: HCPCS | Performed by: FAMILY MEDICINE

## 2021-06-14 PROCEDURE — 3017F COLORECTAL CA SCREEN DOC REV: CPT | Performed by: FAMILY MEDICINE

## 2021-06-14 PROCEDURE — G8419 CALC BMI OUT NRM PARAM NOF/U: HCPCS | Performed by: FAMILY MEDICINE

## 2021-06-14 RX ORDER — AMLODIPINE BESYLATE 10 MG/1
TABLET ORAL
Qty: 90 TABLET | Refills: 3 | Status: SHIPPED | OUTPATIENT
Start: 2021-06-14 | End: 2021-07-20 | Stop reason: SDUPTHER

## 2021-06-14 NOTE — PROGRESS NOTES
HISTORY OF PRESENT ILLNESS  Oswald Sahu is a 63 y.o. male, present with current medical history of HTN, CVA in 2010, Hypercholest, slowing down on cigs offered UA for hematuia but pt again opted, patient also offered to be seen by cardiologist for further care and further evaluation unfortunately patient refused again and opted the current medications lists social family and surgical history today review'd with the following complaint and concern of not been compliant with medical advice and is recommended preventive measures,     last colonoscopy was never offered but opted, offered lab testing including psa but opted again, no family hx of prostate cancer      No family hx of breast cancer in male    no family hx of colon cancer, parent  of old age, ++sexaully active and uses Safe sex, + physically active,         Current Outpatient Medications   Medication Sig Dispense Refill    amLODIPine (NORVASC) 10 mg tablet TAKE 1 TABLET BY MOUTH EVERY DAY 90 Tablet 3    rosuvastatin (CRESTOR) 20 mg tablet TAKE 1 TABLET BY MOUTH EVERY NIGHT 90 Tab 3    aspirin delayed-release 81 mg tablet Take 1 Tab by mouth daily.  90 Tab 3    varenicline (CHANTIX STARTER АНДРЕЙ) 0.5 mg (11)- 1 mg (43) DsPk As directed (Patient not taking: Reported on 2020) 1 Dose Pack 0     No Known Allergies  Past Medical History:   Diagnosis Date    Acute MI, inferoposterior wall, initial episode of care ([address] 2011    Arthritis     CAD (coronary artery disease) 2011    CAD (coronary artery disease), native coronary artery 2011    CAD (coronary artery disease), native coronary artery 2011    Chronic pain     CVA, old, hemiparesis ([address] 2019    CVA, old, speech/language deficit 2017    Encounter for immunization 2018    HTN, goal below 130/80 2017    Hypercholesterolemia 2017    Hypertension 2011    Stroke ([address]     Stroke ([address] 2019    Tobacco abuse disorder 4/12/2017    Unstable angina ([facility] 4/22/2011     History reviewed. No pertinent surgical history. Family History   Problem Relation Age of Onset    Dementia Mother     Emphysema Father     Hypertension Father      Social History     Tobacco Use    Smoking status: Current Every Day Smoker     Packs/day: 1.00     Years: 35.00     Pack years: 35.00    Smokeless tobacco: Never Used    Tobacco comment: 2 packs of cigs last 1 month   Substance Use Topics    Alcohol use: No      Lab Results   Component Value Date/Time    Glucose 100 (H) 07/29/2019 10:40 AM    LDL, calculated 57 07/29/2019 10:40 AM    Creatinine 1.04 07/29/2019 10:40 AM      Lab Results   Component Value Date/Time    Cholesterol, total 113 07/29/2019 10:40 AM    HDL Cholesterol 32 (L) 07/29/2019 10:40 AM    LDL, calculated 57 07/29/2019 10:40 AM    Triglyceride 119 07/29/2019 10:40 AM        Review of Systems   Constitutional: Negative for chills, fever and malaise/fatigue. HENT: Negative for nosebleeds. Eyes: Negative for pain. Respiratory: Negative for cough and wheezing. Cardiovascular: Negative for chest pain and leg swelling. Gastrointestinal: Negative for constipation, diarrhea and nausea. Genitourinary: Negative for frequency. Musculoskeletal: Negative for joint pain and myalgias. Skin: Negative for rash. Neurological: Negative for loss of consciousness and headaches. Endo/Heme/Allergies: Does not bruise/bleed easily. Psychiatric/Behavioral: Negative for depression. The patient is not nervous/anxious and does not have insomnia. All other systems reviewed and are negative. Physical Exam  Vitals and nursing note reviewed. Constitutional:       Appearance: He is well-developed. HENT:      Head: Normocephalic and atraumatic. Mouth/Throat:      Pharynx: No oropharyngeal exudate. Eyes:      Conjunctiva/sclera: Conjunctivae normal.      Pupils: Pupils are equal, round, and reactive to light.    Neck: Thyroid: No thyromegaly. Vascular: No JVD. Cardiovascular:      Rate and Rhythm: Normal rate and regular rhythm. Heart sounds: Normal heart sounds. No murmur heard. No friction rub. Pulmonary:      Effort: Pulmonary effort is normal. No respiratory distress. Breath sounds: Normal breath sounds. No wheezing or rales. Abdominal:      General: Bowel sounds are normal. There is no distension. Palpations: Abdomen is soft. Tenderness: There is no abdominal tenderness. Musculoskeletal:         General: No tenderness. Cervical back: Normal range of motion and neck supple. Lymphadenopathy:      Cervical: No cervical adenopathy. Skin:     General: Skin is warm. Findings: No erythema or rash. Neurological:      Mental Status: He is alert and oriented to person, place, and time. Mental status is at baseline. Sensory: Sensory deficit present. Motor: Weakness present. Coordination: Coordination abnormal.      Gait: Gait abnormal.      Deep Tendon Reflexes: Reflexes abnormal.   Psychiatric:         Behavior: Behavior normal.         ASSESSMENT and PLAN  Diagnoses and all orders for this visit:    1. Cerebrovascular accident (CVA) due to other mechanism ([facility]  -     amLODIPine (NORVASC) 10 mg tablet; One tablet daily  -     CBC W/O DIFF; Future  -     METABOLIC PANEL, COMPREHENSIVE; Future  -     LIPID PANEL; Future  -     PSA, DIAGNOSTIC (PROSTATE SPECIFIC AG); Future  -     URINALYSIS W/ RFLX MICROSCOPIC; Future  -     TSH 3RD GENERATION; Future    2. CVA, old, hemiparesis ([facility]  -     CBC W/O DIFF; Future  -     METABOLIC PANEL, COMPREHENSIVE; Future  -     LIPID PANEL; Future  -     PSA, DIAGNOSTIC (PROSTATE SPECIFIC AG); Future  -     URINALYSIS W/ RFLX MICROSCOPIC; Future  -     TSH 3RD GENERATION; Future    3. Screening for colon cancer  -     REFERRAL TO GASTROENTEROLOGY    4. CVA, old, speech/language deficit  -     amLODIPine (NORVASC) 10 mg tablet;  One tablet daily    5. Tobacco abuse disorder  -     amLODIPine (NORVASC) 10 mg tablet; One tablet daily    6. Hypercholesterolemia  -     amLODIPine (NORVASC) 10 mg tablet; One tablet daily    7. HTN, goal below 140/90  -     amLODIPine (NORVASC) 10 mg tablet; One tablet daily  -     CBC W/O DIFF; Future  -     METABOLIC PANEL, COMPREHENSIVE; Future  -     LIPID PANEL; Future  -     PSA, DIAGNOSTIC (PROSTATE SPECIFIC AG); Future  -     URINALYSIS W/ RFLX MICROSCOPIC; Future  -     TSH 3RD GENERATION; Future    8. Abnormal results of function studies of other organs and systems   -     PSA, DIAGNOSTIC (PROSTATE SPECIFIC AG); Future    9.  Tachycardia  -     REFERRAL TO CARDIOLOGY

## 2021-06-14 NOTE — PATIENT INSTRUCTIONS
Stopping Smokeless Tobacco Use: Care Instructions Your Care Instructions Smokeless tobacco comes in many forms, such as snuff and chewing tobacco: · Snuff is finely ground tobacco sold in cans or pouches. Most of the time, snuff is used by putting a \"pinch\" or \"dip\" between the lower lip or cheek and the gum. · Chewing tobacco is sold as loose leaves, plugs, or twists. It is chewed or placed between the cheek and the gum or teeth. There are plenty of reasons to stop using smokeless tobacco. These products are harmful. They are not risk-free alternatives to smoking. Smokeless tobacco contains nicotine, which is addicting. Though using smokeless tobacco is less harmful than smoking cigarettes, it can cause serious health problems, such as: 
· White patches or red sores in your mouth that can turn into mouth cancer involving the lip, tongue, or cheek. · Tooth loss and other dental problems. · Gum disease. Your gums may pull away from your teeth and not grow back. People who use smokeless tobacco crave the nicotine in it. Giving up smokeless tobacco is much harder than simply changing a habit. Your body has to stop craving the nicotine. It is hard to quit, but you can do it. Many tools are available for people who want to quit using smokeless tobacco. You may find that combining tools works best for you. There are several steps to quitting. First you get ready to quit. Then you get support to help you. After that, you learn new skills and behaviors to quit. For many people, a necessary step is getting and using medicine. Your doctor will help you set up the plan that best meets your needs. You may want to attend a tobacco cessation program. When you choose a program, look for one that has proven success. Ask your doctor for ideas.  You will greatly increase your chances of success if you take medicine as well as get counseling or join a cessation program. 
Some of the changes you feel when you first quit smokeless tobacco are uncomfortable. Your body will miss the nicotine at first, and you may feel short-tempered and grumpy. You may have trouble sleeping or concentrating. Medicine can help you deal with these symptoms. You may struggle with changing your habits and rituals. The last step is the tricky one: Be prepared for the urge to use smokeless tobacco to continue for a time. This is a lot to deal with, but keep at it. You will feel better. Follow-up care is a key part of your treatment and safety. Be sure to make and go to all appointments, and call your doctor if you are having problems. It's also a good idea to know your test results and keep a list of the medicines you take. How can you care for yourself at home? · Ask your family, friends, and coworkers for support. You have a better chance of quitting if you have help and support. · Join a support group for people who are trying to quit using smokeless tobacco. 
· Set a quit date. Pick your date carefully so that it is not right in the middle of a big deadline or stressful time. After you quit, do not use smokeless tobacco even once. Get rid of all spit cups, cans, and pouches after your last use. Clean your house and your clothes so that they do not smell of tobacco. 
· Learn how to be a non-user. Think about ways you can avoid those things that make you reach for tobacco. 
? Learn some ways to deal with cravings, like calling a friend or going for a walk. Cravings often pass. ? Avoid situations that put you at greatest risk for using smokeless tobacco. For some people, it is hard to spend time with friends without dipping or chewing. For others, they might skip a coffee break with coworkers who smoke or use smokeless tobacco. 
? Change your daily routine. Take a different route to work, or eat a meal in a different place. · Cut down on stress.  Calm yourself or release tension by doing an activity you enjoy, such as reading a book, taking a hot bath, or gardening. · Talk to your doctor or pharmacist about nicotine replacement therapy. You still get nicotine, but you do not use tobacco. Nicotine replacement products help you slowly reduce the amount of nicotine you need. Many of these products are available over the counter. They include nicotine patches, gum, lozenges, and inhalers. · Ask your doctor about bupropion (Wellbutrin) or varenicline (Chantix), which are prescription medicines. They do not contain nicotine. They help you by reducing withdrawal symptoms, such as stress and anxiety. · Get regular exercise. Having healthy habits will help your body move past its craving for nicotine. · Be prepared to keep trying. Most people are not successful the first few times they try to quit. Do not get mad at yourself if you use tobacco again. Make a list of things you learned, and think about when you want to try again, such as next week, next month, or next year. Where can you learn more? Go to http://www.gray.com/ Enter J620 in the search box to learn more about \"Stopping Smokeless Tobacco Use: Care Instructions. \" Current as of: March 12, 2020               Content Version: 12.8 © 7311-4823 [employer]. Care instructions adapted under license by [employer] (which disclaims liability or warranty for this information). If you have questions about a medical condition or this instruction, always ask your healthcare professional. [address] any warranty or liability for your use of this information.

## 2021-06-14 NOTE — PROGRESS NOTES
Chief Complaint   Patient presents with   [facility] Annual Wellness Visit     1. Have you been to the ER, urgent care clinic since your last visit? Hospitalized since your last visit? No    2. Have you seen or consulted any other health care providers outside of the [address] [place] [address] since your last visit? Include any pap smears or colon screening. No    Verified patient with two type of identifiers.   denies c/o at present    Dr Cheri Ramírez aware of elevated blood pressure

## 2021-07-06 ENCOUNTER — TELEPHONE (OUTPATIENT)
Dept: FAMILY MEDICINE CLINIC | Age: 66
End: 2021-07-06

## 2021-07-06 NOTE — TELEPHONE ENCOUNTER
Spoke with patient and states he has been getting SOB for 1 week and only when walking. Patient denies any SOB when at rest, chest pain, cough, and fever. Advised patient should go to the ER to be evaluated but stated he did not want to go and wanted to check with Dr. Alayna oSw. Patient reports other than the SOB he feels fine. Informed patient will check with Dr. Alayna Sow. Message given to Dr. Alayna Sow.

## 2021-07-06 NOTE — TELEPHONE ENCOUNTER
Pt W#[phone], pt has been having shortness of breath for the last week when he walks , no other symptoms except that.    Would like to speak to

## 2021-07-08 RX ORDER — ALBUTEROL SULFATE 90 UG/1
1 AEROSOL, METERED RESPIRATORY (INHALATION)
Qty: 1 INHALER | Refills: 1 | Status: SHIPPED | OUTPATIENT
Start: 2021-07-08 | End: 2021-07-20 | Stop reason: SDUPTHER

## 2021-07-08 RX ORDER — METHYLPREDNISOLONE 4 MG/1
TABLET ORAL
Qty: 1 DOSE PACK | Refills: 0 | Status: SHIPPED | OUTPATIENT
Start: 2021-07-08 | End: 2021-07-16

## 2021-07-08 RX ORDER — DOXYCYCLINE 100 MG/1
100 CAPSULE ORAL 2 TIMES DAILY
Qty: 20 CAPSULE | Refills: 0 | Status: SHIPPED | OUTPATIENT
Start: 2021-07-08 | End: 2021-07-18

## 2021-07-16 RX ORDER — METHYLPREDNISOLONE 4 MG/1
TABLET ORAL
Qty: 1 DOSE PACK | Refills: 0 | Status: SHIPPED | OUTPATIENT
Start: 2021-07-16 | End: 2021-07-20 | Stop reason: ALTCHOICE

## 2021-07-20 ENCOUNTER — OFFICE VISIT (OUTPATIENT)
Dept: FAMILY MEDICINE CLINIC | Age: 66
End: 2021-07-20
Payer: MEDICARE

## 2021-07-20 VITALS
BODY MASS INDEX: 29.97 KG/M2 | TEMPERATURE: 96.2 F | HEART RATE: 109 BPM | RESPIRATION RATE: 20 BRPM | SYSTOLIC BLOOD PRESSURE: 127 MMHG | DIASTOLIC BLOOD PRESSURE: 75 MMHG | WEIGHT: 197.1 LBS | OXYGEN SATURATION: 100 %

## 2021-07-20 DIAGNOSIS — R60.0 LOCALIZED EDEMA: ICD-10-CM

## 2021-07-20 DIAGNOSIS — I25.119 CORONARY ARTERY DISEASE INVOLVING NATIVE CORONARY ARTERY WITH ANGINA PECTORIS, UNSPECIFIED WHETHER NATIVE OR TRANSPLANTED HEART (HCC): Primary | ICD-10-CM

## 2021-07-20 DIAGNOSIS — I63.89 CEREBROVASCULAR ACCIDENT (CVA) DUE TO OTHER MECHANISM (HCC): ICD-10-CM

## 2021-07-20 DIAGNOSIS — E78.00 HYPERCHOLESTEROLEMIA: ICD-10-CM

## 2021-07-20 DIAGNOSIS — I69.328 CVA, OLD, SPEECH/LANGUAGE DEFICIT: ICD-10-CM

## 2021-07-20 DIAGNOSIS — Z72.0 TOBACCO ABUSE DISORDER: ICD-10-CM

## 2021-07-20 DIAGNOSIS — I10 HTN, GOAL BELOW 140/90: ICD-10-CM

## 2021-07-20 DIAGNOSIS — N50.89 ENLARGED TESTICLE: ICD-10-CM

## 2021-07-20 DIAGNOSIS — K40.90 HERNIA OF SCROTUM: ICD-10-CM

## 2021-07-20 PROCEDURE — G8419 CALC BMI OUT NRM PARAM NOF/U: HCPCS | Performed by: FAMILY MEDICINE

## 2021-07-20 PROCEDURE — 3017F COLORECTAL CA SCREEN DOC REV: CPT | Performed by: FAMILY MEDICINE

## 2021-07-20 PROCEDURE — G8432 DEP SCR NOT DOC, RNG: HCPCS | Performed by: FAMILY MEDICINE

## 2021-07-20 PROCEDURE — G8754 DIAS BP LESS 90: HCPCS | Performed by: FAMILY MEDICINE

## 2021-07-20 PROCEDURE — G8752 SYS BP LESS 140: HCPCS | Performed by: FAMILY MEDICINE

## 2021-07-20 PROCEDURE — 99214 OFFICE O/P EST MOD 30 MIN: CPT | Performed by: FAMILY MEDICINE

## 2021-07-20 PROCEDURE — G8427 DOCREV CUR MEDS BY ELIG CLIN: HCPCS | Performed by: FAMILY MEDICINE

## 2021-07-20 RX ORDER — TAMSULOSIN HYDROCHLORIDE 0.4 MG/1
0.4 CAPSULE ORAL DAILY
Qty: 30 CAPSULE | Refills: 0 | Status: SHIPPED | OUTPATIENT
Start: 2021-07-20 | End: 2021-07-20

## 2021-07-20 RX ORDER — AMLODIPINE BESYLATE 5 MG/1
TABLET ORAL
Qty: 30 TABLET | Refills: 3
Start: 2021-07-20 | End: 2021-07-23

## 2021-07-20 RX ORDER — POTASSIUM CHLORIDE 20 MEQ/1
20 TABLET, EXTENDED RELEASE ORAL DAILY
Qty: 30 TABLET | Refills: 0 | Status: SHIPPED | OUTPATIENT
Start: 2021-07-20 | End: 2021-07-20

## 2021-07-20 RX ORDER — TAMSULOSIN HYDROCHLORIDE 0.4 MG/1
0.4 CAPSULE ORAL DAILY
Qty: 90 CAPSULE | Refills: 1 | Status: SHIPPED | OUTPATIENT
Start: 2021-07-20

## 2021-07-20 RX ORDER — ALBUTEROL SULFATE 90 UG/1
1 AEROSOL, METERED RESPIRATORY (INHALATION)
Qty: 1 INHALER | Refills: 1
Start: 2021-07-20

## 2021-07-20 RX ORDER — FUROSEMIDE 40 MG/1
40 TABLET ORAL DAILY
Qty: 30 TABLET | Refills: 0 | Status: SHIPPED | OUTPATIENT
Start: 2021-07-20 | End: 2021-07-20

## 2021-07-20 RX ORDER — POTASSIUM CHLORIDE 20 MEQ/1
20 TABLET, EXTENDED RELEASE ORAL DAILY
Qty: 90 TABLET | Refills: 1 | Status: SHIPPED | OUTPATIENT
Start: 2021-07-20

## 2021-07-20 RX ORDER — FUROSEMIDE 40 MG/1
40 TABLET ORAL DAILY
Qty: 90 TABLET | Refills: 1 | Status: SHIPPED | OUTPATIENT
Start: 2021-07-20

## 2021-07-20 NOTE — PROGRESS NOTES
HISTORY OF PRESENT ILLNESS  Jarod Ann is a 63 y.o. male. Patient present with a complaint of rt-sided grapefruit size swelling onrt inguinal area state that the swelling does not fluctuates, is not painful other time the patient does notice it for few months but not much wanted to do about it, pt was  tolld to go to ER but not compliant,  patient currently does not work out with strenuous activity and is notsexually active in addition patient is not currently  notworking and  does not do a lot of heavy lifting,the swelling is significant  on the right side no pain at this time also patient states that there is not constipation episode, denies any penile discharge stating that it is common in family the patient like to get it fixed if it is possible otherwise denies any other complaint,  Patient complains of edema. The location of the edema is lower leg(s) bilateral, ankle(s) bilateral, feet bilateral.  The edema has been moderate. Onset of symptoms was a few weeks ago, gradually worsening since that time. The edema is present all day. The patient states the problem is long-standing. on the pt last visits, the patient did not have much of the legs swelling, the weight was also better, Today the patient denies leg pain, denies rash, and legs lesion,and the denial of dizziness,   the wt went up by > 30 pounds,               Current Outpatient Medications   Medication Sig Dispense Refill    albuterol (PROVENTIL HFA, VENTOLIN HFA, PROAIR HFA) 90 mcg/actuation inhaler Take 1 Puff by inhalation every four (4) hours as needed for Wheezing. 1 Inhaler 1    furosemide (LASIX) 40 mg tablet Take 1 Tablet by mouth daily. 30 Tablet 0    potassium chloride (K-DUR, KLOR-CON) 20 mEq tablet Take 1 Tablet by mouth daily.  30 Tablet 0    amLODIPine (NORVASC) 10 mg tablet One tablet daily 90 Tablet 3    rosuvastatin (CRESTOR) 20 mg tablet TAKE 1 TABLET BY MOUTH EVERY NIGHT 90 Tab 3    aspirin delayed-release 81 mg tablet Take 1 Tab by mouth daily. 90 Tab 3    varenicline (CHANTIX STARTER АНДРЕЙ) 0.5 mg (11)- 1 mg (43) DsPk As directed (Patient not taking: Reported on 12/14/2020) 1 Dose Pack 0     No Known Allergies  Past Medical History:   Diagnosis Date    Acute MI, inferoposterior wall, initial episode of care ([facility] 4/22/2011    Arthritis     CAD (coronary artery disease) 4/23/2011    CAD (coronary artery disease), native coronary artery 4/22/2011    CAD (coronary artery disease), native coronary artery 4/22/2011    Chronic pain     CVA, old, hemiparesis ([address] 5/28/2019    CVA, old, speech/language deficit 4/12/2017    Encounter for immunization 1/25/2018    HTN, goal below 130/80 4/12/2017    Hypercholesterolemia 4/12/2017    Hypertension 4/22/2011    Stroke ([facility]     Stroke ([facility] 5/28/2019    Tobacco abuse disorder 4/12/2017    Unstable angina ([facility] 4/22/2011     History reviewed. No pertinent surgical history. Family History   Problem Relation Age of Onset    Dementia Mother     Emphysema Father     Hypertension Father      Social History     Tobacco Use    Smoking status: Current Every Day Smoker     Packs/day: 1.00     Years: 35.00     Pack years: 35.00    Smokeless tobacco: Never Used    Tobacco comment: 2 packs of cigs last 1 month   Substance Use Topics    Alcohol use: No      Lab Results   Component Value Date/Time    Cholesterol, total 113 07/29/2019 10:40 AM    HDL Cholesterol 32 (L) 07/29/2019 10:40 AM    LDL, calculated 57 07/29/2019 10:40 AM    Triglyceride 119 07/29/2019 10:40 AM     Lab Results   Component Value Date/Time    ALT (SGPT) 7 07/29/2019 10:40 AM    Alk. phosphatase 68 07/29/2019 10:40 AM    Bilirubin, total 0.4 07/29/2019 10:40 AM    Albumin 4.1 07/29/2019 10:40 AM    Protein, total 6.8 07/29/2019 10:40 AM    PLATELET 229 91/65/0656 10:40 AM        Review of Systems   Constitutional: Negative for chills and fever. HENT: Negative for congestion and nosebleeds.     Eyes: Negative for blurred vision and pain. Respiratory: Negative for cough, shortness of breath and wheezing. Cardiovascular: Positive for leg swelling. Negative for chest pain. Gastrointestinal: Negative for constipation, diarrhea, nausea and vomiting. Genitourinary: Negative for dysuria and frequency. Musculoskeletal: Negative for joint pain and myalgias. Skin: Negative for itching and rash. Neurological: Negative for dizziness, loss of consciousness and headaches. Psychiatric/Behavioral: Negative for depression. The patient is not nervous/anxious and does not have insomnia. Physical Exam  Vitals and nursing note reviewed. Constitutional:       Appearance: He is well-developed. HENT:      Head: Normocephalic and atraumatic. Mouth/Throat:      Pharynx: No oropharyngeal exudate. Eyes:      Conjunctiva/sclera: Conjunctivae normal.      Pupils: Pupils are equal, round, and reactive to light. Neck:      Thyroid: No thyromegaly. Vascular: No JVD. Cardiovascular:      Rate and Rhythm: Normal rate and regular rhythm. Heart sounds: Normal heart sounds. No murmur heard. No friction rub. Pulmonary:      Effort: Pulmonary effort is normal. No respiratory distress. Breath sounds: Normal breath sounds. No wheezing or rales. Abdominal:      General: Bowel sounds are normal. There is no distension. Palpations: Abdomen is soft. Tenderness: There is no abdominal tenderness. Hernia: A hernia is present. Hernia is present in the right inguinal area. Genitourinary:     Testes:         Right: Swelling present. Right testis is undescended. Musculoskeletal:         General: No tenderness. Cervical back: Normal range of motion and neck supple. Left foot: Normal range of motion and normal capillary refill. No swelling, tenderness or bony tenderness.       Comments: Nl pulses, nl visual inspection nl monoF   Lymphadenopathy:      Cervical: No cervical adenopathy. Skin:     General: Skin is warm. Findings: No erythema or rash. Neurological:      Mental Status: He is alert and oriented to person, place, and time. Deep Tendon Reflexes: Reflexes are normal and symmetric. Psychiatric:         Behavior: Behavior normal.         ASSESSMENT and PLAN  Diagnoses and all orders for this visit:    1. Coronary artery disease involving native coronary artery with angina pectoris, unspecified whether native or transplanted heart (HCC)  -     REFERRAL TO CARDIOLOGY  -     albuterol (PROVENTIL HFA, VENTOLIN HFA, PROAIR HFA) 90 mcg/actuation inhaler; Take 1 Puff by inhalation every four (4) hours as needed for Wheezing. 2. Enlarged testicle  -     REFERRAL TO UROLOGY    3. Hernia of scrotum  -     REFERRAL TO UROLOGY    4. Localized edema    5. CVA, old, speech/language deficit  -     amLODIPine (NORVASC) 5 mg tablet; One tablet daily    6. Cerebrovascular accident (CVA) due to other mechanism ([address]  -     amLODIPine (NORVASC) 5 mg tablet; One tablet daily    7. Tobacco abuse disorder  -     amLODIPine (NORVASC) 5 mg tablet; One tablet daily    8. Hypercholesterolemia  -     amLODIPine (NORVASC) 5 mg tablet; One tablet daily    9. HTN, goal below 140/90  -     amLODIPine (NORVASC) 5 mg tablet;  One tablet daily        pt was told to go to ER ASAP but again opted at this time    Was told to decrease physical acitivity,avoid heavy lifting and pushing, start stool softner such as colace 100 mg one tab tid w/ meals, metamucil powder one cup twice daily, avoid fatty, fast and fried foods, small meals and more frequent avoid late dinner avoid overeating, increase po fluid intake daily, compliance advised RTC if worsen

## 2021-07-20 NOTE — PROGRESS NOTES
1. Have you been to the ER, urgent care clinic since your last visit? Hospitalized since your last visit? No    2. Have you seen or consulted any other health care providers outside of the [address] [place] [address] since your last visit? Include any pap smears or colon screening. No     Chief Complaint   Patient presents with    Shortness of Breath     Lots of fluid.     Abdominal Pain

## 2021-07-22 RX ORDER — METHYLPREDNISOLONE 4 MG/1
TABLET ORAL
Qty: 1 DOSE PACK | Refills: 0 | Status: SHIPPED | OUTPATIENT
Start: 2021-07-22 | End: 2021-07-23

## 2021-07-23 ENCOUNTER — APPOINTMENT (OUTPATIENT)
Dept: NON INVASIVE DIAGNOSTICS | Age: 66
End: 2021-07-23
Attending: NURSE PRACTITIONER
Payer: MEDICARE

## 2021-07-23 ENCOUNTER — APPOINTMENT (OUTPATIENT)
Dept: GENERAL RADIOLOGY | Age: 66
End: 2021-07-23
Attending: STUDENT IN AN ORGANIZED HEALTH CARE EDUCATION/TRAINING PROGRAM
Payer: MEDICARE

## 2021-07-23 ENCOUNTER — HOSPITAL ENCOUNTER (EMERGENCY)
Age: 66
Discharge: LEFT BEFORE DISCHARGE | End: 2021-07-23
Attending: STUDENT IN AN ORGANIZED HEALTH CARE EDUCATION/TRAINING PROGRAM | Admitting: STUDENT IN AN ORGANIZED HEALTH CARE EDUCATION/TRAINING PROGRAM
Payer: MEDICARE

## 2021-07-23 VITALS
HEART RATE: 132 BPM | BODY MASS INDEX: 31.78 KG/M2 | SYSTOLIC BLOOD PRESSURE: 95 MMHG | TEMPERATURE: 98.5 F | WEIGHT: 197.75 LBS | OXYGEN SATURATION: 95 % | HEIGHT: 66 IN | DIASTOLIC BLOOD PRESSURE: 79 MMHG | RESPIRATION RATE: 19 BRPM

## 2021-07-23 DIAGNOSIS — I50.9 CONGESTIVE HEART FAILURE, UNSPECIFIED HF CHRONICITY, UNSPECIFIED HEART FAILURE TYPE (HCC): ICD-10-CM

## 2021-07-23 DIAGNOSIS — I48.91 ATRIAL FIBRILLATION, UNSPECIFIED TYPE (HCC): Primary | ICD-10-CM

## 2021-07-23 DIAGNOSIS — E87.70 HYPERVOLEMIA, UNSPECIFIED HYPERVOLEMIA TYPE: ICD-10-CM

## 2021-07-23 PROBLEM — Z86.73 HISTORY OF CVA (CEREBROVASCULAR ACCIDENT): Status: ACTIVE | Noted: 2021-07-23

## 2021-07-23 PROBLEM — R60.0 BILATERAL LOWER EXTREMITY EDEMA: Status: ACTIVE | Noted: 2021-07-23

## 2021-07-23 PROBLEM — K40.90 HERNIA OF SCROTUM: Status: ACTIVE | Noted: 2021-07-23

## 2021-07-23 LAB
ALBUMIN SERPL-MCNC: 3 G/DL (ref 3.5–5)
ALBUMIN/GLOB SERPL: 1.1 {RATIO} (ref 1.1–2.2)
ALP SERPL-CCNC: 67 U/L (ref 45–117)
ALT SERPL-CCNC: 38 U/L (ref 12–78)
ANION GAP SERPL CALC-SCNC: 6 MMOL/L (ref 5–15)
AST SERPL-CCNC: 23 U/L (ref 15–37)
BASOPHILS # BLD: 0 K/UL (ref 0–0.1)
BASOPHILS NFR BLD: 0 % (ref 0–1)
BILIRUB SERPL-MCNC: 0.9 MG/DL (ref 0.2–1)
BNP SERPL-MCNC: 9193 PG/ML
BUN SERPL-MCNC: 25 MG/DL (ref 6–20)
BUN/CREAT SERPL: 22 (ref 12–20)
CALCIUM SERPL-MCNC: 7.9 MG/DL (ref 8.5–10.1)
CHLORIDE SERPL-SCNC: 107 MMOL/L (ref 97–108)
CK SERPL-CCNC: 66 U/L (ref 39–308)
CO2 SERPL-SCNC: 26 MMOL/L (ref 21–32)
COMMENT, HOLDF: NORMAL
CREAT SERPL-MCNC: 1.14 MG/DL (ref 0.7–1.3)
DIFFERENTIAL METHOD BLD: ABNORMAL
EOSINOPHIL # BLD: 0 K/UL (ref 0–0.4)
EOSINOPHIL NFR BLD: 0 % (ref 0–7)
ERYTHROCYTE [DISTWIDTH] IN BLOOD BY AUTOMATED COUNT: 14.6 % (ref 11.5–14.5)
GLOBULIN SER CALC-MCNC: 2.8 G/DL (ref 2–4)
GLUCOSE SERPL-MCNC: 119 MG/DL (ref 65–100)
HCT VFR BLD AUTO: 40 % (ref 36.6–50.3)
HGB BLD-MCNC: 13.6 G/DL (ref 12.1–17)
IMM GRANULOCYTES # BLD AUTO: 0 K/UL (ref 0–0.04)
IMM GRANULOCYTES NFR BLD AUTO: 0 % (ref 0–0.5)
INR PPP: 1.2 (ref 0.9–1.1)
LYMPHOCYTES # BLD: 1.4 K/UL (ref 0.8–3.5)
LYMPHOCYTES NFR BLD: 15 % (ref 12–49)
MCH RBC QN AUTO: 30.9 PG (ref 26–34)
MCHC RBC AUTO-ENTMCNC: 34 G/DL (ref 30–36.5)
MCV RBC AUTO: 90.9 FL (ref 80–99)
MONOCYTES # BLD: 0.9 K/UL (ref 0–1)
MONOCYTES NFR BLD: 9 % (ref 5–13)
NEUTS SEG # BLD: 7 K/UL (ref 1.8–8)
NEUTS SEG NFR BLD: 76 % (ref 32–75)
NRBC # BLD: 0 K/UL (ref 0–0.01)
NRBC BLD-RTO: 0 PER 100 WBC
PLATELET # BLD AUTO: 140 K/UL (ref 150–400)
PMV BLD AUTO: 12.3 FL (ref 8.9–12.9)
POTASSIUM SERPL-SCNC: 3.7 MMOL/L (ref 3.5–5.1)
PROT SERPL-MCNC: 5.8 G/DL (ref 6.4–8.2)
PROTHROMBIN TIME: 12.1 SEC (ref 9–11.1)
RBC # BLD AUTO: 4.4 M/UL (ref 4.1–5.7)
SAMPLES BEING HELD,HOLD: NORMAL
SODIUM SERPL-SCNC: 139 MMOL/L (ref 136–145)
TROPONIN I SERPL-MCNC: <0.05 NG/ML
WBC # BLD AUTO: 9.3 K/UL (ref 4.1–11.1)

## 2021-07-23 PROCEDURE — 71046 X-RAY EXAM CHEST 2 VIEWS: CPT

## 2021-07-23 PROCEDURE — 96375 TX/PRO/DX INJ NEW DRUG ADDON: CPT

## 2021-07-23 PROCEDURE — 74011000250 HC RX REV CODE- 250: Performed by: STUDENT IN AN ORGANIZED HEALTH CARE EDUCATION/TRAINING PROGRAM

## 2021-07-23 PROCEDURE — 36415 COLL VENOUS BLD VENIPUNCTURE: CPT

## 2021-07-23 PROCEDURE — 93005 ELECTROCARDIOGRAM TRACING: CPT

## 2021-07-23 PROCEDURE — 84484 ASSAY OF TROPONIN QUANT: CPT

## 2021-07-23 PROCEDURE — 83880 ASSAY OF NATRIURETIC PEPTIDE: CPT

## 2021-07-23 PROCEDURE — 74011250637 HC RX REV CODE- 250/637: Performed by: STUDENT IN AN ORGANIZED HEALTH CARE EDUCATION/TRAINING PROGRAM

## 2021-07-23 PROCEDURE — 96376 TX/PRO/DX INJ SAME DRUG ADON: CPT

## 2021-07-23 PROCEDURE — 82550 ASSAY OF CK (CPK): CPT

## 2021-07-23 PROCEDURE — 85610 PROTHROMBIN TIME: CPT

## 2021-07-23 PROCEDURE — 85025 COMPLETE CBC W/AUTO DIFF WBC: CPT

## 2021-07-23 PROCEDURE — 96374 THER/PROPH/DIAG INJ IV PUSH: CPT

## 2021-07-23 PROCEDURE — 80053 COMPREHEN METABOLIC PANEL: CPT

## 2021-07-23 PROCEDURE — 99285 EMERGENCY DEPT VISIT HI MDM: CPT

## 2021-07-23 PROCEDURE — 74011250636 HC RX REV CODE- 250/636: Performed by: STUDENT IN AN ORGANIZED HEALTH CARE EDUCATION/TRAINING PROGRAM

## 2021-07-23 RX ORDER — CARVEDILOL 12.5 MG/1
12.5 TABLET ORAL ONCE
Status: COMPLETED | OUTPATIENT
Start: 2021-07-23 | End: 2021-07-23

## 2021-07-23 RX ORDER — DILTIAZEM HYDROCHLORIDE 5 MG/ML
10 INJECTION INTRAVENOUS
Status: CANCELLED | OUTPATIENT
Start: 2021-07-23 | End: 2021-07-23

## 2021-07-23 RX ORDER — FUROSEMIDE 10 MG/ML
40 INJECTION INTRAMUSCULAR; INTRAVENOUS ONCE
Status: DISCONTINUED | OUTPATIENT
Start: 2021-07-23 | End: 2021-07-23

## 2021-07-23 RX ORDER — FUROSEMIDE 10 MG/ML
40 INJECTION INTRAMUSCULAR; INTRAVENOUS
Status: COMPLETED | OUTPATIENT
Start: 2021-07-23 | End: 2021-07-23

## 2021-07-23 RX ORDER — DILTIAZEM HYDROCHLORIDE 5 MG/ML
10 INJECTION INTRAVENOUS
Status: COMPLETED | OUTPATIENT
Start: 2021-07-23 | End: 2021-07-23

## 2021-07-23 RX ORDER — AMLODIPINE BESYLATE 10 MG/1
10 TABLET ORAL DAILY
COMMUNITY
End: 2021-07-23

## 2021-07-23 RX ORDER — CARVEDILOL 12.5 MG/1
12.5 TABLET ORAL 2 TIMES DAILY WITH MEALS
Qty: 60 TABLET | Refills: 0 | Status: SHIPPED | OUTPATIENT
Start: 2021-07-23 | End: 2021-08-22

## 2021-07-23 RX ORDER — FUROSEMIDE 10 MG/ML
40 INJECTION INTRAMUSCULAR; INTRAVENOUS ONCE
Status: CANCELLED | OUTPATIENT
Start: 2021-07-23 | End: 2021-07-23

## 2021-07-23 RX ORDER — METHYLPREDNISOLONE 4 MG/1
TABLET ORAL
COMMUNITY

## 2021-07-23 RX ADMIN — DILTIAZEM HYDROCHLORIDE 10 MG: 5 INJECTION INTRAVENOUS at 12:06

## 2021-07-23 RX ADMIN — CARVEDILOL 12.5 MG: 12.5 TABLET, FILM COATED ORAL at 13:49

## 2021-07-23 RX ADMIN — DILTIAZEM HYDROCHLORIDE 10 MG: 5 INJECTION INTRAVENOUS at 13:01

## 2021-07-23 RX ADMIN — FUROSEMIDE 40 MG: 10 INJECTION, SOLUTION INTRAMUSCULAR; INTRAVENOUS at 11:26

## 2021-07-23 RX ADMIN — DILTIAZEM HYDROCHLORIDE 10 MG: 5 INJECTION INTRAVENOUS at 11:26

## 2021-07-23 NOTE — ED PROVIDER NOTES
EMERGENCY DEPARTMENT HISTORY AND PHYSICAL EXAM          Date: 7/23/2021  Patient Name: Leola Curry  Attending of Record: Dr. Avtar Baez    History of Presenting Illness     Chief Complaint   Patient presents with   [facility] Referral / Consult     pt ambulatory to triage. pt was seen a couple of days at his urologist for a hernia of the scrotum. pt was told to come in a few days ago because MD was worried it was about to bust per pt. pt says he did not have a ride.  Shortness of Breath     pt reports sob for 2-3 weeks. pt reports his doctor gave him some medicine but it has not been helping. pt talking to this RN in complete sentences and is having unlabored breathing at this time. History Provided By: Patient    HPI: Leola Curry is a 63 y.o. male, with past medical history of CVA, CAD, HTN, MI who comes in complaining of moderate, constant progressively worsening shortness of breath and bilateral lower extremity edema ongoing for the past month associated with testicular swelling which she has noticed for the past several months. Patient states that he was evaluated by his primary care provider a few days ago, was told to come to the ED for further work-up, states that he has been taking 40 mg of p.o. Lasix for the past few days. Patient denies any testicular pain, no abdominal pain, fevers, chills, chest pain. Denies any history of cardiac dysrhythmias although does note that his heart rate goes up when he gets nervous in the doctor's office. Not on any blood thinners. Denies any other acute complaints at this time. PCP: Ck Mays MD    There are no other complaints, changes, or physical findings at this time. Current Outpatient Medications   Medication Sig Dispense Refill    methylPREDNISolone (MEDROL DOSEPACK) 4 mg tablet Take  by mouth Specific Days and Specific Times.       carvediloL (Coreg) 12.5 mg tablet Take 1 Tablet by mouth two (2) times daily (with meals) for 30 days. 60 Tablet 0    apixaban (Eliquis) 5 mg tablet Take 1 Tablet by mouth two (2) times a day for 30 days. Indications: treatment to prevent blood clots in chronic atrial fibrillation 60 Tablet 0    albuterol (PROVENTIL HFA, VENTOLIN HFA, PROAIR HFA) 90 mcg/actuation inhaler Take 1 Puff by inhalation every four (4) hours as needed for Wheezing. 1 Inhaler 1    furosemide (LASIX) 40 mg tablet TAKE 1 TABLET BY MOUTH DAILY 90 Tablet 1    tamsulosin (FLOMAX) 0.4 mg capsule TAKE 1 CAPSULE BY MOUTH DAILY 90 Capsule 1    potassium chloride (K-DUR, KLOR-CON) 20 mEq tablet TAKE 1 TABLET BY MOUTH DAILY 90 Tablet 1    aspirin delayed-release 81 mg tablet Take 1 Tab by mouth daily. 80 Tab 3       Past History     Past Medical History:  Past Medical History:   Diagnosis Date    Acute MI, inferoposterior wall, initial episode of care ([facility] 4/22/2011    Arthritis     Atrial fibrillation with RVR ([facility] 7/23/2021    Bilateral lower extremity edema 7/23/2021    CAD (coronary artery disease) 4/23/2011    CAD (coronary artery disease), native coronary artery 4/22/2011    CAD (coronary artery disease), native coronary artery 4/22/2011    Chronic pain     CVA, old, hemiparesis ([address] 5/28/2019    CVA, old, speech/language deficit 4/12/2017    Encounter for immunization 1/25/2018    Hernia of scrotum 7/23/2021    History of CVA (cerebrovascular accident) 7/23/2021    HTN, goal below 130/80 4/12/2017    Hypercholesterolemia 4/12/2017    Hypertension 4/22/2011    Stroke ([address]     Stroke ([address] 5/28/2019    Tobacco abuse disorder 4/12/2017    Unstable angina ([address] 4/22/2011       Past Surgical History:  History reviewed. No pertinent surgical history.     Family History:  Family History   Problem Relation Age of Onset   [facility] Dementia Mother     Emphysema Father     Hypertension Father        Social History:  Social History     Tobacco Use    Smoking status: Former Smoker     Packs/day: 1.00     Years: 35.00     Pack years: 35.00    Smokeless tobacco: Never Used    Tobacco comment: 2 packs of cigs last 1 month   Vaping Use    Vaping Use: Never used   Substance Use Topics    Alcohol use: No    Drug use: No       Allergies:  No Known Allergies      Review of Systems   Review of Systems   Constitutional: Negative for chills and fever. HENT: Negative for rhinorrhea and sore throat. Respiratory: Positive for shortness of breath. Negative for cough and wheezing. Cardiovascular: Positive for leg swelling. Negative for chest pain. Gastrointestinal: Negative for abdominal distention, abdominal pain, constipation, diarrhea, nausea and vomiting. Genitourinary: Negative for difficulty urinating. All other systems reviewed and are negative. Physical Exam   Physical Exam  Vitals and nursing note reviewed. Exam conducted with a chaperone present. Constitutional:       General: He is in acute distress (moderate). Appearance: He is obese. HENT:      Head: Normocephalic and atraumatic. Eyes:      Pupils: Pupils are equal, round, and reactive to light. Cardiovascular:      Rate and Rhythm: Tachycardia present. Rhythm irregular. Pulmonary:      Effort: Pulmonary effort is normal.      Breath sounds: Normal breath sounds. Chest:      Chest wall: No tenderness. Abdominal:      Palpations: Abdomen is soft. Tenderness: There is no abdominal tenderness. There is no guarding or rebound. Hernia: There is no hernia in the left inguinal area or right inguinal area. Genitourinary:     Testes:         Right: Swelling and testicular hydrocele present. Tenderness not present. Left: Swelling present. Tenderness not present. Musculoskeletal:      Right lower leg: Edema (3+) present. Left lower leg: Edema (3+) present. Skin:     General: Skin is warm and dry. Capillary Refill: Capillary refill takes less than 2 seconds. Neurological:      General: No focal deficit present.       Mental Status: He is alert. Psychiatric:         Mood and Affect: Mood normal.         Behavior: Behavior normal.        Diagnostic Study Results     Labs -     Recent Results (from the past 12 hour(s))   EKG, 12 LEAD, INITIAL    Collection Time: 07/23/21  9:58 AM   Result Value Ref Range    Ventricular Rate 170 BPM    Atrial Rate 166 BPM    QRS Duration 116 ms    Q-T Interval 296 ms    QTC Calculation (Bezet) 497 ms    Calculated R Axis -54 degrees    Calculated T Axis 129 degrees    Diagnosis       Atrial fibrillation with rapid ventricular response  Left anterior fascicular block  Cannot rule out Anterior infarct , age undetermined  T wave abnormality, consider lateral ischemia  No previous ECGs available     CBC WITH AUTOMATED DIFF    Collection Time: 07/23/21 10:32 AM   Result Value Ref Range    WBC 9.3 4.1 - 11.1 K/uL    RBC 4.40 4. 10 - 5.70 M/uL    HGB 13.6 12.1 - 17.0 g/dL    HCT 40.0 36.6 - 50.3 %    MCV 90.9 80.0 - 99.0 FL    MCH 30.9 26.0 - 34.0 PG    MCHC 34.0 30.0 - 36.5 g/dL    RDW 14.6 (H) 11.5 - 14.5 %    PLATELET 117 (L) 655 - 400 K/uL    MPV 12.3 8.9 - 12.9 FL    NRBC 0.0 0  WBC    ABSOLUTE NRBC 0.00 0.00 - 0.01 K/uL    NEUTROPHILS 76 (H) 32 - 75 %    LYMPHOCYTES 15 12 - 49 %    MONOCYTES 9 5 - 13 %    EOSINOPHILS 0 0 - 7 %    BASOPHILS 0 0 - 1 %    IMMATURE GRANULOCYTES 0 0.0 - 0.5 %    ABS. NEUTROPHILS 7.0 1.8 - 8.0 K/UL    ABS. LYMPHOCYTES 1.4 0.8 - 3.5 K/UL    ABS. MONOCYTES 0.9 0.0 - 1.0 K/UL    ABS. EOSINOPHILS 0.0 0.0 - 0.4 K/UL    ABS. BASOPHILS 0.0 0.0 - 0.1 K/UL    ABS. IMM.  GRANS. 0.0 0.00 - 0.04 K/UL    DF AUTOMATED     METABOLIC PANEL, COMPREHENSIVE    Collection Time: 07/23/21 10:32 AM   Result Value Ref Range    Sodium 139 136 - 145 mmol/L    Potassium 3.7 3.5 - 5.1 mmol/L    Chloride 107 97 - 108 mmol/L    CO2 26 21 - 32 mmol/L    Anion gap 6 5 - 15 mmol/L    Glucose 119 (H) 65 - 100 mg/dL    BUN 25 (H) 6 - 20 MG/DL    Creatinine 1.14 0.70 - 1.30 MG/DL    BUN/Creatinine ratio 22 (H) 12 - 20      GFR est AA >60 >60 ml/min/1.73m2    GFR est non-AA >60 >60 ml/min/1.73m2    Calcium 7.9 (L) 8.5 - 10.1 MG/DL    Bilirubin, total 0.9 0.2 - 1.0 MG/DL    ALT (SGPT) 38 12 - 78 U/L    AST (SGOT) 23 15 - 37 U/L    Alk. phosphatase 67 45 - 117 U/L    Protein, total 5.8 (L) 6.4 - 8.2 g/dL    Albumin 3.0 (L) 3.5 - 5.0 g/dL    Globulin 2.8 2.0 - 4.0 g/dL    A-G Ratio 1.1 1.1 - 2.2     CK W/ REFLX CKMB    Collection Time: 07/23/21 10:32 AM   Result Value Ref Range    CK 66 39 - 308 U/L   TROPONIN I    Collection Time: 07/23/21 10:32 AM   Result Value Ref Range    Troponin-I, Qt. <0.05 <0.05 ng/mL   NT-PRO BNP    Collection Time: 07/23/21 10:32 AM   Result Value Ref Range    NT pro-BNP 9,193 (H) <125 PG/ML   PROTHROMBIN TIME + INR    Collection Time: 07/23/21 10:32 AM   Result Value Ref Range    INR 1.2 (H) 0.9 - 1.1      Prothrombin time 12.1 (H) 9.0 - 11.1 sec   SAMPLES BEING HELD    Collection Time: 07/23/21 10:47 AM   Result Value Ref Range    SAMPLES BEING HELD RED     COMMENT        Add-on orders for these samples will be processed based on acceptable specimen integrity and analyte stability, which may vary by analyte. Radiologic Studies -   XR CHEST PA LAT   Final Result   1. There is mild cardiomegaly   2. There is probably a small right pleural effusion versus pleural thickening. CT Results  (Last 48 hours)    None        CXR Results  (Last 48 hours)               07/23/21 1020  XR CHEST PA LAT Final result    Impression:  1. There is mild cardiomegaly   2. There is probably a small right pleural effusion versus pleural thickening. Narrative:  Exam:  2 view chest       Indication: Shortness of breath. COMPARISON: None       PA and lateral views demonstrate the patient is on a cardiac monitor. There is   mild cardiomegaly. There is mild coronary artery calcification. Lungs well   aerated. Lungs are clear.  There is minimal blunting the right costophrenic angle   laterally and posteriorly suggesting small pleural effusion. The osseous   structures are unremarkable. Medical Decision Making   I am the first provider for this patient. I reviewed the vital signs, available nursing notes, past medical history, past surgical history, family history and social history. Vital Signs-Reviewed the patient's vital signs. Patient Vitals for the past 12 hrs:   Temp Pulse Resp BP SpO2   07/23/21 1321  (!) 132 19     07/23/21 1207  (!) 162 23  95 %   07/23/21 1200  (!) 153 22 95/79 95 %   07/23/21 1145  (!) 148 27 113/77 94 %   07/23/21 1130  (!) 155 28 105/86 95 %   07/23/21 1126  (!) 157 25  94 %   07/23/21 1122  (!) 158 22  94 %   07/23/21 1115  (!) 161 23 103/81 94 %   07/23/21 1112  (!) 156 22  95 %   07/23/21 1100  (!) 164 24 (!) 110/95 96 %   07/23/21 1026  (!) 169 24 105/86 98 %   07/23/21 0955 98.5 °F (36.9 °C) 89 22 113/86 100 %       ECG Interpretation: Irregularly irregular rhythm suggestive of A. fib with RVR with a heart rate of 170. Records Reviewed: Nursing Notes and Old Medical Records    Provider Notes (Medical Decision Making):    Daniel Vicente is a 63 y.o. male, with past medical history of CVA, CAD, HTN, MI who comes in complaining of moderate, constant progressively worsening shortness of breath and bilateral lower extremity edema ongoing for the past month associated with testicular swelling which she has noticed for the past several months. He is hemodynamically stable, afebrile, nontoxic-appearing. Irregular regular rhythm noted on bedside monitor with heart rates going up to the 170s. Suggestive of new onset atrial fibrillation. Patient is not anticoagulated, unclear how long this has been going on for. Blood pressures are stable with systolic in the 363A. Will give 10 mg IV of diltiazem for rate control. 3+ pitting edema bilaterally with significant edema noted to the bilateral testes.   Patient is likely in congestive heart failure, BNP ordered to evaluate. Will give 40 mg of IV Lasix. Troponin also ordered to rule out ACS. Chest x-ray ordered to evaluate for any evidence of consolidations versus infiltrates versus pulmonary edema. Low suspicion that this is pneumonia given lack of coughs or fevers. I anticipate that patient may require admission to the hospital.       ED Course and Progress Notes:   Initial assessment performed. The patients presenting problems have been discussed, and they are in agreement with the care plan formulated and outlined with them. I have encouraged them to ask questions as they arise throughout their visit. ED Course as of Jul 23 1459 Fri Jul 23, 2021   1141 Diltiazem administered, heart rate now in the 130s but continues to be irregular. Will reassess. [MA]   3006 Reevaluated, discussed with him the importance of admission given concern for heart failure and new onset atrial fibrillation. I discussed risks of going home including strokes, respiratory failure, and cardiac arrest.  Patient understands, adamantly refuses admission. He states that he would not like to stay. Will give additional 10 mg of IV diltiazem his heart rate is now in the 150s and irregular. We will try to touch base with cardiology to set up an urgent outpatient follow-up appointment. [MA]   6963 Discussed case with cardiology who will come down and evaluate patient and provide recommendations. [MA]   [phone] Cardiology at bedside    [MA]   [phone] Cardiology evaluated patient, offered patient CLARK with cardioversion and patient adamantly refused. Cardiology reiterated the importance of further work-up inpatient including cardiac arrest, patient continues to refuse. He will leave [address] Patient understands. Will prepare for discharge.     [MA]      ED Course User Index  [MA] Familia Muhammad MD       Critical Care:  CRITICAL CARE NOTE :    10:32 AM  IMPENDING DETERIORATION -Cardiovascular  ASSOCIATED RISK FACTORS - Dysrhythmia  MANAGEMENT- Bedside Assessment and Supervision of Care  INTERPRETATION -  Xrays, ECG and Blood Pressure  INTERVENTIONS -chemical cardioversion with diltiazem x3  CASE REVIEW - Medical Sub-Specialist  TREATMENT RESPONSE -Improved and Stable  PERFORMED BY - Resident and Physician    NOTES   :    I, Dr. Miladys Page and Dr. Yo Valdez, have spent 40 minutes of critical care time involved in lab review, consultations with specialist, family decision- making, bedside attention and documentation. This time excludes time spent in any separate billed procedures. During this entire length of time I was immediately available to the patient. Diagnosis     Clinical Impression:   1. Atrial fibrillation, unspecified type ([address]    2. Hypervolemia, unspecified hypervolemia type    3. Congestive heart failure, unspecified HF chronicity, unspecified heart failure type [facility])        Disposition:    AMA  Patient left AGAINST MEDICAL ADVICE. AMA paperwork signed. Once again reiterated risks which include stroke, respiratory failure, and cardiac arrest.  Patient understands. Leaving AMA. DISCHARGE PLAN:   1. Discharge Medication List as of 7/23/2021  1:19 PM      START taking these medications    Details   carvediloL (Coreg) 12.5 mg tablet Take 1 Tablet by mouth two (2) times daily (with meals) for 30 days. , Normal, Disp-60 Tablet, R-0      apixaban (Eliquis) 5 mg tablet Take 1 Tablet by mouth two (2) times a day for 30 days.  Indications: treatment to prevent blood clots in chronic atrial fibrillation, Normal, Disp-60 Tablet, R-0         CONTINUE these medications which have NOT CHANGED    Details   methylPREDNISolone (MEDROL DOSEPACK) 4 mg tablet Take  by mouth Specific Days and Specific Times., Historical Med      albuterol (PROVENTIL HFA, VENTOLIN HFA, PROAIR HFA) 90 mcg/actuation inhaler Take 1 Puff by inhalation every four (4) hours as needed for Wheezing., No Print, Disp-1 Inhaler, R-1      furosemide (LASIX) 40 mg tablet TAKE 1 TABLET BY MOUTH DAILY, Normal, Disp-90 Tablet, R-1**Patient requests 90 days supply**      tamsulosin (FLOMAX) 0.4 mg capsule TAKE 1 CAPSULE BY MOUTH DAILY, Normal, Disp-90 Capsule, R-1**Patient requests 90 days supply**      potassium chloride (K-DUR, KLOR-CON) 20 mEq tablet TAKE 1 TABLET BY MOUTH DAILY, Normal, Disp-90 Tablet, R-1**Patient requests 90 days supply**      aspirin delayed-release 81 mg tablet Take 1 Tab by mouth daily. , Normal, Disp-90 Tab,R-3         STOP taking these medications       amLODIPine (NORVASC) 10 mg tablet Comments:   Reason for Stoppin.   Follow-up Information     Follow up With Specialties Details Why Contact Info    Katherine Lin MD Family Medicine Call in 1 week  [address]  [phone]      [facility] EMERGENCY DEPT Emergency Medicine Go to  As needed, If symptoms worsen [address] in 1 week  [address] [address]        3.  Return to ED if worse         Resident Signature: Dr. Dotty Haywood; PGY-2, Emergency Medicine

## 2021-07-23 NOTE — DISCHARGE INSTRUCTIONS
You were seen in the ED today for shortness of breath and volume overload. You are found to have atrial fibrillation and concerns for congestive heart failure. We discussed with you the importance of admission, however you refused. We discussed risks of leaving including strokes, cardiac arrest.  You will be leaving [address] Cardiology evaluated you as well, they will see you in the office. We have sent over prescriptions for Coreg and Eliquis. Please take medications as prescribed. You may discontinue your amlodipine per cardiology's recommendations. Return to the ED for worsening symptoms or any other urgent or emergent concerns you may have.

## 2021-07-23 NOTE — ED NOTES
Jory Gu at bedside evaluating pt     Assumed care of pt from triage. Pt is A&O x 4. Pt reports CC of hernia/ testicle swelling and was referred to by his MD to be seen in the ED. Upon arrival to ED pt was in Afib RVR W/ hr in the 160s. Pt reports no hx of afib and has bilateral leg swelling. Pt states testicular swelling has been worsen since last year and states no complaints of no pain. Pt is place on the monitor x 3, call bell within reach     1110 Pt is very adamant & voice his concerns on not being admitted into the hospital, Pt states he Jagdeep Hernández has a dog at home w/ no one to take care of his dog\" This RN & Jory Gu tried to explain the benefits of staying and the risk of leaving however pt still wants to leave.     1330 cardiology at bedside Ayleen Salvador) evaluating pt, Per pt he does not want no further treatment to be done

## 2021-07-23 NOTE — CONSULTS
[facility]     Date of  Admission: 7/23/2021 10:10 AM     Admission type:Emergency    Referral for: afib with RVR, LE edema, possible HF  Referral by: ED MD      Subjective:     Leola Curry is a 63 y.o. male admitted for No admission diagnoses are documented for this encounter. .    Mr. Flakito Anderson came to the ED at the instructions of his PCP who was seen several days ago - did not have a ride until today. On 7/20/21 at PCP office c/o worsening LE edema and scrotal edema. WT increased 30 #s  169#s 6/14/2021 to 197#s today. PCP suspected scrotal hernia, and started patient on lasix for LE edema. Patient is a poor historian, does not read or write. On admission, -170s, ECG with afib RVR. Patient denies CP, palpitations, dizziness/lightheadedness, has experienced some SOB/SILVEIRA. NTproBNP >9000  Troponin neg  CXray with cardiomegaly and sm right pl effusion    Received Diltiazem 10mg IVP x 3 with no slowing of arrhythmia  Received IV lasix 40mg once with 700cc out currently    PMH: CAD with MI, CVA (patient can not recall when or if interventions were done); +HTN, Dyslipidemia    Previous treatment/evaluation includes [facility] in [facility] . Cardiac risk factors: smoking/ tobacco exposure, dyslipidemia, obesity, sedentary life style, male gender, hypertension, stress.       Patient Active Problem List    Diagnosis Date Noted    Atrial fibrillation with RVR ([facility] [address] 07/23/2021    History of CVA (cerebrovascular accident) 07/23/2021    Hernia of scrotum 07/23/2021    Bilateral lower extremity edema 07/23/2021    Stroke [facility]) 05/28/2019    CVA, old, hemiparesis ([facility] [address] 05/28/2019    CVA, old, speech/language deficit 04/12/2017    HTN, goal below 140/90 04/12/2017    Hypercholesterolemia 04/12/2017    Tobacco abuse disorder 04/12/2017      Ck Mays MD  Past Medical History:   Diagnosis Date    Acute MI, inferoposterior wall, initial episode of care [facility]) 4/22/2011    Arthritis     Atrial fibrillation with RVR ([facility] [address] 7/23/2021    Bilateral lower extremity edema 7/23/2021    CAD (coronary artery disease) 4/23/2011    CAD (coronary artery disease), native coronary artery 4/22/2011    CAD (coronary artery disease), native coronary artery 4/22/2011    Chronic pain     CVA, old, hemiparesis ([address] 5/28/2019    CVA, old, speech/language deficit 4/12/2017    Encounter for immunization 1/25/2018    Hernia of scrotum 7/23/2021    History of CVA (cerebrovascular accident) 7/23/2021    HTN, goal below 130/80 4/12/2017    Hypercholesterolemia 4/12/2017    Hypertension 4/22/2011    Stroke ([facility] [address]     Stroke ([facility] [address] 5/28/2019    Tobacco abuse disorder 4/12/2017    Unstable angina ([facility] [address] 4/22/2011      Social History     Socioeconomic History    Marital status: SINGLE     Spouse name: Not on file    Number of children: Not on file    Years of education: Not on file    Highest education level: Not on file   Tobacco Use    Smoking status: Former Smoker     Packs/day: 1.00     Years: 35.00     Pack years: 35.00    Smokeless tobacco: Never Used    Tobacco comment: 2 packs of cigs last 1 month   Vaping Use    Vaping Use: Never used   Substance and Sexual Activity    Alcohol use: No    Drug use: No    Sexual activity: Not Currently     Social Determinants of Health     Financial Resource Strain:     Difficulty of Paying Living Expenses:    Food Insecurity:     Worried About Running Out of Food in the Last Year:     Ran Out of Food in the Last Year:    Transportation Needs:     Lack of Transportation (Medical):      Lack of Transportation (Non-Medical):    Physical Activity:     Days of Exercise per Week:     Minutes of Exercise per Session:    Stress:     Feeling of Stress :    Social Connections:     Frequency of Communication with Friends and Family:     Frequency of Social Gatherings with Friends and Family:     Attends Sikhism Services:     Active Member of Clubs or Organizations:     Attends Club or Organization Meetings:     Marital Status:      No Known Allergies   Family History   Problem Relation Age of Onset    Dementia Mother     Emphysema Father     Hypertension Father          Review of Symptoms: poor historian   Constitutional: negative  Eyes: negative  Ears, nose, mouth, throat, and face: negative  Respiratory: SOB  Cardiovascular: negative  Gastrointestinal: negative  Genitourinary:scrotal edema  Musculoskeletal:negative  Neurological: negative  Behvioral/Psych: negative  Endocrine: negative    ROS         Objective:      Visit Vitals  BP 95/79   Pulse (!) 132   Temp 98.5 °F (36.9 °C)   Resp 19   Ht 5' 6\" (1.676 m)   Wt 197 lb 12 oz (89.7 kg)   SpO2 95%   BMI 31.92 kg/m²       Physical Assessment:   General Appearance: disheveled, older appearing than stated age, obese,  male in no acute distress, alert, cooperative,  Eyes: sclera anicteric  Mouth/Throat: moist mucous membranes; oral pharynx clear  Neck: supple; no JVD or bruit  Pulmonary:  clear to auscultation bilaterally; good effort  Cardiovascular: irr, irr, no murmur, click, rub, or gallop  Abdomen: soft, non-tender, non-distended; bowel sounds normal  Musculoskeletal: no deformity; moves all extremities  Extremities: pitting, weeping edema jesus LE; and scotal edema, palpable distal pulses   Skin: warm and dry  Neuro: grossly normal   Psych: normal mood and affect given the setting      Data Review:   Recent Labs     07/23/21  1032   WBC 9.3   HGB 13.6   HCT 40.0   *     Recent Labs     07/23/21  1032      K 3.7      CO2 26   *   BUN 25*   CREA 1.14   CA 7.9*   ALB 3.0*   TBILI 0.9   ALT 38   INR 1.2*       Recent Labs     07/23/21  1032   TROIQ <0.05         Intake/Output Summary (Last 24 hours) at 7/23/2021 1330  Last data filed at 7/23/2021 1214  Gross per 24 hour   Intake    Output 600 ml   Net -600 ml Cardiographics    Telemetry: afib with RVR  ECG: afib with RVR    Echocardiogram: pending    CXRAY:  1. There is mild cardiomegaly  2. There is probably a small right pleural effusion versus pleural thickening     Assessment:       Principal Problem:    Atrial fibrillation with RVR ([address] (7/23/2021)    Active Problems:    HTN, goal below 140/90 (4/12/2017)      Hypercholesterolemia (4/12/2017)      Tobacco abuse disorder (4/12/2017)      History of CVA (cerebrovascular accident) (7/23/2021)      Hernia of scrotum (7/23/2021)      Bilateral lower extremity edema (7/23/2021)         Plan:     Afib with RVR:  New onset? ?  Patient unaware of the palpitations, and there is no previous documentation of afib or other arrhythmias  Dilt not slowing rate down. Patient very clear to MD and staff that he is not staying, refusing admission, as he has a dog at home and no one to care for it. Initially had planned to perform CLARK and cardioversion with an attempt to convert to SR, but patient has refused this procedure. Explained to the risk associated with uncontrolled afib, the possibility of heart failure and that he would be better managed as an inpatient. Still adamant about going home. Echo ordered   Recommend Coreg and DOAC if patient willing to take when he leaves. Difficult scenario as not sure if patient truly understands the implications of the afib and HF, and does not have an advocate or family member to assist him. If he chooses to leave against medical advice, he needs f/u with his PCP Mond-Tuesday next week. Will not schedule him to [address] [facility] at this time.     Thank you for consulting ORTEGA Lai NP

## 2021-07-23 NOTE — PROGRESS NOTES
Pharmacy Medication Reconciliation     The patient was interviewed regarding current PTA medication list, use and drug allergies; The patient was questioned regarding use of any other inhalers, topical products, over the counter medications, herbal medications, vitamin products or ophthalmic/nasal/otic medication use. Allergy Update: Patient has no known allergies. Recommendations/Findings: The following amendments were made to the patient's active medication list on file at [id number] [facility]:     1) Additions: methylprednisolone dose pack    2) Deletions: rosuvastatin 20 mg QHS, Chantix starter pack    3) Changes: amlodipine 5 mg --> 10 mg daily    Pertinent Findings: patient completed a 10 day course of doxycycline 100 mg BID on 7/18/21, per [facility] PTA med list with patient and [facility] [address]. PTA medication list was corrected to the following:     Prior to Admission Medications   Prescriptions Last Dose Informant Taking? albuterol (PROVENTIL HFA, VENTOLIN HFA, PROAIR HFA) 90 mcg/actuation inhaler 7/22/2021 at Unknown time  Yes   Sig: Take 1 Puff by inhalation every four (4) hours as needed for Wheezing. amLODIPine (NORVASC) 10 mg tablet 7/23/2021 at Unknown time  Yes   Sig: Take 10 mg by mouth daily. aspirin delayed-release 81 mg tablet 7/23/2021 at Unknown time  Yes   Sig: Take 1 Tab by mouth daily. furosemide (LASIX) 40 mg tablet 7/23/2021 at Unknown time  Yes   Sig: TAKE 1 TABLET BY MOUTH DAILY   methylPREDNISolone (MEDROL DOSEPACK) 4 mg tablet 7/23/2021 at Unknown time  Yes   Sig: Take  by mouth Specific Days and Specific Times.    potassium chloride (K-DUR, KLOR-CON) 20 mEq tablet 7/23/2021 at Unknown time  Yes   Sig: TAKE 1 TABLET BY MOUTH DAILY   tamsulosin (FLOMAX) 0.4 mg capsule 7/23/2021 at Unknown time  Yes   Sig: TAKE 1 CAPSULE BY MOUTH DAILY      Facility-Administered Medications: None        Thank you,  Billy Kasper, PHARMD

## 2021-07-24 LAB
ATRIAL RATE: 166 BPM
CALCULATED R AXIS, ECG10: -54 DEGREES
CALCULATED T AXIS, ECG11: 129 DEGREES
DIAGNOSIS, 93000: NORMAL
Q-T INTERVAL, ECG07: 296 MS
QRS DURATION, ECG06: 116 MS
QTC CALCULATION (BEZET), ECG08: 497 MS
VENTRICULAR RATE, ECG03: 170 BPM

## 2022-03-18 PROBLEM — Z86.73 HISTORY OF CVA (CEREBROVASCULAR ACCIDENT): Status: ACTIVE | Noted: 2021-07-23

## 2022-03-18 PROBLEM — Z72.0 TOBACCO ABUSE DISORDER: Status: ACTIVE | Noted: 2017-04-12

## 2022-03-18 PROBLEM — R60.0 BILATERAL LOWER EXTREMITY EDEMA: Status: ACTIVE | Noted: 2021-07-23

## 2022-03-19 PROBLEM — I63.9 STROKE (HCC): Status: ACTIVE | Noted: 2019-05-28

## 2022-03-19 PROBLEM — I69.328 CVA, OLD, SPEECH/LANGUAGE DEFICIT: Status: ACTIVE | Noted: 2017-04-12

## 2022-03-19 PROBLEM — I48.91 ATRIAL FIBRILLATION WITH RVR (HCC): Status: ACTIVE | Noted: 2021-07-23

## 2022-03-19 PROBLEM — E78.00 HYPERCHOLESTEROLEMIA: Status: ACTIVE | Noted: 2017-04-12

## 2022-03-19 PROBLEM — I69.359 CVA, OLD, HEMIPARESIS (HCC): Status: ACTIVE | Noted: 2019-05-28

## 2022-03-20 PROBLEM — K40.90 HERNIA OF SCROTUM: Status: ACTIVE | Noted: 2021-07-23

## 2022-03-20 PROBLEM — I10 HTN, GOAL BELOW 140/90: Status: ACTIVE | Noted: 2017-04-12

## 2023-05-16 RX ORDER — METHYLPREDNISOLONE 4 MG/1
TABLET ORAL
COMMUNITY

## 2023-05-16 RX ORDER — ASPIRIN 81 MG/1
81 TABLET ORAL DAILY
COMMUNITY
Start: 2020-01-29

## 2023-05-16 RX ORDER — TAMSULOSIN HYDROCHLORIDE 0.4 MG/1
0.4 CAPSULE ORAL DAILY
COMMUNITY
Start: 2021-07-20

## 2023-05-16 RX ORDER — FUROSEMIDE 40 MG/1
40 TABLET ORAL DAILY
COMMUNITY
Start: 2021-07-20

## 2023-05-16 RX ORDER — POTASSIUM CHLORIDE 20 MEQ/1
20 TABLET, EXTENDED RELEASE ORAL DAILY
COMMUNITY
Start: 2021-07-20

## 2023-05-16 RX ORDER — ALBUTEROL SULFATE 90 UG/1
1 AEROSOL, METERED RESPIRATORY (INHALATION) EVERY 4 HOURS PRN
COMMUNITY
Start: 2021-07-20